# Patient Record
Sex: FEMALE | Race: BLACK OR AFRICAN AMERICAN | NOT HISPANIC OR LATINO | Employment: FULL TIME | URBAN - NONMETROPOLITAN AREA
[De-identification: names, ages, dates, MRNs, and addresses within clinical notes are randomized per-mention and may not be internally consistent; named-entity substitution may affect disease eponyms.]

---

## 2022-02-13 ENCOUNTER — APPOINTMENT (EMERGENCY)
Dept: RADIOLOGY | Facility: HOSPITAL | Age: 27
End: 2022-02-13
Payer: COMMERCIAL

## 2022-02-13 ENCOUNTER — HOSPITAL ENCOUNTER (EMERGENCY)
Facility: HOSPITAL | Age: 27
Discharge: HOME | End: 2022-02-13
Attending: EMERGENCY MEDICINE | Admitting: EMERGENCY MEDICINE
Payer: COMMERCIAL

## 2022-02-13 VITALS
WEIGHT: 293 LBS | DIASTOLIC BLOOD PRESSURE: 65 MMHG | TEMPERATURE: 97.3 F | HEIGHT: 67 IN | RESPIRATION RATE: 16 BRPM | OXYGEN SATURATION: 99 % | SYSTOLIC BLOOD PRESSURE: 124 MMHG | HEART RATE: 85 BPM | BODY MASS INDEX: 45.99 KG/M2

## 2022-02-13 DIAGNOSIS — J45.909 UNCOMPLICATED ASTHMA, UNSPECIFIED ASTHMA SEVERITY, UNSPECIFIED WHETHER PERSISTENT: Primary | ICD-10-CM

## 2022-02-13 LAB
ALBUMIN SERPL BCP-MCNC: 4.1 G/DL (ref 3.5–5)
ALP SERPL-CCNC: 80 U/L (ref 46–116)
ALT SERPL W P-5'-P-CCNC: 38 U/L (ref 12–78)
ANION GAP SERPL CALCULATED.3IONS-SCNC: 9 MMOL/L (ref 4–13)
AST SERPL W P-5'-P-CCNC: 12 U/L (ref 5–45)
BACTERIA UR QL AUTO: ABNORMAL /HPF
BASOPHILS # BLD AUTO: 0.04 THOUSANDS/ΜL (ref 0–0.1)
BASOPHILS NFR BLD AUTO: 1 % (ref 0–1)
BILIRUB SERPL-MCNC: 0.8 MG/DL (ref 0.2–1)
BILIRUB UR QL STRIP: ABNORMAL
BUN SERPL-MCNC: 11 MG/DL (ref 5–25)
CALCIUM SERPL-MCNC: 9 MG/DL (ref 8.3–10.1)
CARDIAC TROPONIN I PNL SERPL HS: <2 NG/L
CHLORIDE SERPL-SCNC: 100 MMOL/L (ref 100–108)
CLARITY UR: ABNORMAL
CO2 SERPL-SCNC: 27 MMOL/L (ref 21–32)
COLOR UR: ABNORMAL
CREAT SERPL-MCNC: 0.95 MG/DL (ref 0.6–1.3)
D DIMER PPP FEU-MCNC: <0.27 UG/ML FEU
EOSINOPHIL # BLD AUTO: 0.06 THOUSAND/ΜL (ref 0–0.61)
EOSINOPHIL NFR BLD AUTO: 1 % (ref 0–6)
ERYTHROCYTE [DISTWIDTH] IN BLOOD BY AUTOMATED COUNT: 13.4 % (ref 11.6–15.1)
EXT PREG TEST URINE: NEGATIVE
EXT. CONTROL ED NAV: NORMAL
GFR SERPL CREATININE-BSD FRML MDRD: 82 ML/MIN/1.73SQ M
GLUCOSE SERPL-MCNC: 126 MG/DL (ref 65–140)
GLUCOSE UR STRIP-MCNC: NEGATIVE MG/DL
HCT VFR BLD AUTO: 39.2 % (ref 34.8–46.1)
HGB BLD-MCNC: 12.8 G/DL (ref 11.5–15.4)
HGB UR QL STRIP.AUTO: NEGATIVE
IMM GRANULOCYTES # BLD AUTO: 0.02 THOUSAND/UL (ref 0–0.2)
IMM GRANULOCYTES NFR BLD AUTO: 0 % (ref 0–2)
KETONES UR STRIP-MCNC: ABNORMAL MG/DL
LEUKOCYTE ESTERASE UR QL STRIP: NEGATIVE
LIPASE SERPL-CCNC: 85 U/L (ref 73–393)
LYMPHOCYTES # BLD AUTO: 2.67 THOUSANDS/ΜL (ref 0.6–4.47)
LYMPHOCYTES NFR BLD AUTO: 38 % (ref 14–44)
MCH RBC QN AUTO: 26.7 PG (ref 26.8–34.3)
MCHC RBC AUTO-ENTMCNC: 32.7 G/DL (ref 31.4–37.4)
MCV RBC AUTO: 82 FL (ref 82–98)
MONOCYTES # BLD AUTO: 0.49 THOUSAND/ΜL (ref 0.17–1.22)
MONOCYTES NFR BLD AUTO: 7 % (ref 4–12)
NEUTROPHILS # BLD AUTO: 3.71 THOUSANDS/ΜL (ref 1.85–7.62)
NEUTS SEG NFR BLD AUTO: 53 % (ref 43–75)
NITRITE UR QL STRIP: NEGATIVE
NON-SQ EPI CELLS URNS QL MICRO: ABNORMAL /HPF
NRBC BLD AUTO-RTO: 0 /100 WBCS
PH UR STRIP.AUTO: 6 [PH]
PLATELET # BLD AUTO: 352 THOUSANDS/UL (ref 149–390)
PMV BLD AUTO: 9.9 FL (ref 8.9–12.7)
POTASSIUM SERPL-SCNC: 3.5 MMOL/L (ref 3.5–5.3)
PROT SERPL-MCNC: 7.8 G/DL (ref 6.4–8.2)
PROT UR STRIP-MCNC: ABNORMAL MG/DL
RBC # BLD AUTO: 4.8 MILLION/UL (ref 3.81–5.12)
RBC #/AREA URNS AUTO: ABNORMAL /HPF
SARS-COV-2 RNA RESP QL NAA+PROBE: NEGATIVE
SODIUM SERPL-SCNC: 136 MMOL/L (ref 136–145)
SP GR UR STRIP.AUTO: >=1.03 (ref 1–1.03)
UROBILINOGEN UR QL STRIP.AUTO: 0.2 E.U./DL
WBC # BLD AUTO: 6.99 THOUSAND/UL (ref 4.31–10.16)
WBC #/AREA URNS AUTO: ABNORMAL /HPF

## 2022-02-13 PROCEDURE — 96360 HYDRATION IV INFUSION INIT: CPT

## 2022-02-13 PROCEDURE — 81001 URINALYSIS AUTO W/SCOPE: CPT | Performed by: EMERGENCY MEDICINE

## 2022-02-13 PROCEDURE — 99285 EMERGENCY DEPT VISIT HI MDM: CPT

## 2022-02-13 PROCEDURE — 87635 SARS-COV-2 COVID-19 AMP PRB: CPT | Performed by: EMERGENCY MEDICINE

## 2022-02-13 PROCEDURE — 80053 COMPREHEN METABOLIC PANEL: CPT | Performed by: EMERGENCY MEDICINE

## 2022-02-13 PROCEDURE — 99285 EMERGENCY DEPT VISIT HI MDM: CPT | Performed by: EMERGENCY MEDICINE

## 2022-02-13 PROCEDURE — 81025 URINE PREGNANCY TEST: CPT | Performed by: EMERGENCY MEDICINE

## 2022-02-13 PROCEDURE — 94640 AIRWAY INHALATION TREATMENT: CPT

## 2022-02-13 PROCEDURE — 84484 ASSAY OF TROPONIN QUANT: CPT | Performed by: EMERGENCY MEDICINE

## 2022-02-13 PROCEDURE — 96361 HYDRATE IV INFUSION ADD-ON: CPT

## 2022-02-13 PROCEDURE — 93005 ELECTROCARDIOGRAM TRACING: CPT

## 2022-02-13 PROCEDURE — 85025 COMPLETE CBC W/AUTO DIFF WBC: CPT | Performed by: EMERGENCY MEDICINE

## 2022-02-13 PROCEDURE — 36415 COLL VENOUS BLD VENIPUNCTURE: CPT | Performed by: EMERGENCY MEDICINE

## 2022-02-13 PROCEDURE — 83690 ASSAY OF LIPASE: CPT | Performed by: EMERGENCY MEDICINE

## 2022-02-13 PROCEDURE — 85379 FIBRIN DEGRADATION QUANT: CPT | Performed by: EMERGENCY MEDICINE

## 2022-02-13 PROCEDURE — 71045 X-RAY EXAM CHEST 1 VIEW: CPT

## 2022-02-13 RX ORDER — NITROGLYCERIN 0.4 MG/1
0.4 TABLET SUBLINGUAL
Status: DISCONTINUED | OUTPATIENT
Start: 2022-02-13 | End: 2022-02-13 | Stop reason: HOSPADM

## 2022-02-13 RX ORDER — IPRATROPIUM BROMIDE AND ALBUTEROL SULFATE 2.5; .5 MG/3ML; MG/3ML
3 SOLUTION RESPIRATORY (INHALATION) ONCE
Status: COMPLETED | OUTPATIENT
Start: 2022-02-13 | End: 2022-02-13

## 2022-02-13 RX ORDER — ASPIRIN 81 MG/1
324 TABLET, CHEWABLE ORAL ONCE
Status: COMPLETED | OUTPATIENT
Start: 2022-02-13 | End: 2022-02-13

## 2022-02-13 RX ORDER — ALBUTEROL SULFATE 90 UG/1
2 AEROSOL, METERED RESPIRATORY (INHALATION) EVERY 6 HOURS PRN
Qty: 6.7 G | Refills: 0 | Status: SHIPPED | OUTPATIENT
Start: 2022-02-13 | End: 2022-02-13 | Stop reason: SDUPTHER

## 2022-02-13 RX ORDER — ALBUTEROL SULFATE 90 UG/1
2 AEROSOL, METERED RESPIRATORY (INHALATION) EVERY 6 HOURS PRN
Qty: 6.7 G | Refills: 0 | Status: SHIPPED | OUTPATIENT
Start: 2022-02-13

## 2022-02-13 RX ADMIN — IPRATROPIUM BROMIDE AND ALBUTEROL SULFATE 3 ML: 2.5; .5 SOLUTION RESPIRATORY (INHALATION) at 10:17

## 2022-02-13 RX ADMIN — SODIUM CHLORIDE 1000 ML: 0.9 INJECTION, SOLUTION INTRAVENOUS at 09:21

## 2022-02-13 RX ADMIN — ASPIRIN 81 MG CHEWABLE TABLET 324 MG: 81 TABLET CHEWABLE at 09:18

## 2022-02-13 RX ADMIN — IPRATROPIUM BROMIDE AND ALBUTEROL SULFATE 3 ML: 2.5; .5 SOLUTION RESPIRATORY (INHALATION) at 09:50

## 2022-02-13 NOTE — ED PROVIDER NOTES
History  Chief Complaint   Patient presents with    Chest Pain     pt arrives reporting chest tightness in middle of chest and left side that has been constant over past few days  pt denies cough, denies fevers, but reoprts pain is making her feel SOB  8/10 left-sided chest pressure for 3 days with associated shortness of breath and radiation to the epigastric area  No fevers or nausea or vomiting  No abdominal pain  Patient has history of hypertension, diabetes  No smoking or drug use  History provided by:  Patient   used: No    Chest Pain  Pain location:  L chest  Pain quality: pressure    Pain radiates to:  Epigastrium  Pain radiates to the back: no    Pain severity now: 8/10  Onset quality:  Gradual  Duration:  3 days  Timing:  Constant  Progression:  Unchanged  Chronicity:  New  Relieved by:  Nothing  Worsened by:  Nothing tried  Ineffective treatments:  None tried  Associated symptoms: shortness of breath    Associated symptoms: no abdominal pain, no altered mental status, no anxiety, no back pain, no claudication, no cough, no dizziness, no dysphagia, no fever, no headache, no lower extremity edema, no nausea, no near-syncope, no numbness, no palpitations, no syncope, not vomiting and no weakness    Risk factors: diabetes mellitus, hypertension and obesity    Risk factors: no birth control and no smoking        None       Past Medical History:   Diagnosis Date    Asthma        Past Surgical History:   Procedure Laterality Date    HIP SURGERY         History reviewed  No pertinent family history  I have reviewed and agree with the history as documented  E-Cigarette/Vaping     E-Cigarette/Vaping Substances     Social History     Tobacco Use    Smoking status: Never Smoker    Smokeless tobacco: Never Used   Substance Use Topics    Alcohol use: Never    Drug use: Never       Review of Systems   Constitutional: Negative for chills and fever     HENT: Negative for ear pain, hearing loss, sore throat, trouble swallowing and voice change  Eyes: Negative for pain and discharge  Respiratory: Positive for shortness of breath  Negative for cough and wheezing  Cardiovascular: Positive for chest pain  Negative for palpitations, claudication, syncope and near-syncope  Gastrointestinal: Negative for abdominal pain, blood in stool, constipation, diarrhea, nausea and vomiting  Genitourinary: Negative for dysuria, flank pain, frequency and hematuria  Musculoskeletal: Negative for back pain, joint swelling, neck pain and neck stiffness  Skin: Negative for rash and wound  Neurological: Negative for dizziness, seizures, syncope, facial asymmetry, weakness, numbness and headaches  Psychiatric/Behavioral: Negative for hallucinations, self-injury and suicidal ideas  All other systems reviewed and are negative  Physical Exam  Physical Exam  Vitals and nursing note reviewed  Constitutional:       General: She is not in acute distress  Appearance: She is well-developed  She is obese  HENT:      Head: Normocephalic and atraumatic  Right Ear: External ear normal       Left Ear: External ear normal    Eyes:      General: No scleral icterus  Right eye: No discharge  Left eye: No discharge  Extraocular Movements: Extraocular movements intact  Conjunctiva/sclera: Conjunctivae normal    Cardiovascular:      Rate and Rhythm: Normal rate and regular rhythm  Heart sounds: Normal heart sounds  No murmur heard  Pulmonary:      Effort: Pulmonary effort is normal       Breath sounds: Normal breath sounds  No decreased breath sounds, wheezing, rhonchi or rales  Abdominal:      General: Bowel sounds are normal  There is no distension  Palpations: Abdomen is soft  Tenderness: There is no abdominal tenderness  There is no guarding or rebound  Musculoskeletal:         General: No deformity  Normal range of motion        Cervical back: Normal range of motion and neck supple  Skin:     General: Skin is warm and dry  Findings: No rash  Neurological:      General: No focal deficit present  Mental Status: She is alert and oriented to person, place, and time  Cranial Nerves: No cranial nerve deficit  Psychiatric:         Mood and Affect: Mood normal          Behavior: Behavior normal          Thought Content:  Thought content normal          Judgment: Judgment normal          Vital Signs  ED Triage Vitals   Temperature Pulse Respirations Blood Pressure SpO2   02/13/22 0857 02/13/22 0856 02/13/22 0856 02/13/22 0856 02/13/22 0856   (!) 97 3 °F (36 3 °C) 103 18 (!) 185/88 98 %      Temp src Heart Rate Source Patient Position - Orthostatic VS BP Location FiO2 (%)   -- -- -- -- --             Pain Score       02/13/22 0856       8           Vitals:    02/13/22 0930 02/13/22 0945 02/13/22 1000 02/13/22 1015   BP: 129/76 101/53 138/80 124/65   Pulse:  84 85 85         Visual Acuity      ED Medications  Medications   nitroglycerin (NITROSTAT) SL tablet 0 4 mg (has no administration in time range)   sodium chloride 0 9 % bolus 1,000 mL (1,000 mL Intravenous New Bag 2/13/22 0921)   aspirin chewable tablet 324 mg (324 mg Oral Given 2/13/22 0918)   ipratropium-albuterol (DUO-NEB) 0 5-2 5 mg/3 mL inhalation solution 3 mL (3 mL Nebulization Given 2/13/22 0950)   ipratropium-albuterol (DUO-NEB) 0 5-2 5 mg/3 mL inhalation solution 3 mL (3 mL Nebulization Given 2/13/22 1017)       Diagnostic Studies  Results Reviewed     Procedure Component Value Units Date/Time    UA w Reflex to Microscopic w Reflex to Culture [[id number]]  (Abnormal) Collected: 02/13/22 0921    Lab Status: Final result Specimen: Urine, Clean Catch Updated: 02/13/22 0959     Color, UA Dk Yellow     Clarity, UA Slightly Cloudy     Specific Gravity, UA >=1 030     pH, UA 6 0     Leukocytes, UA Negative     Nitrite, UA Negative     Protein, UA Trace mg/dl      Glucose, UA Negative mg/dl      Ketones, UA Trace mg/dl      Urobilinogen, UA 0 2 E U /dl      Bilirubin, UA Small     Blood, UA Negative    Urine Microscopic [[id number]]  (Abnormal) Collected: 02/13/22 0921    Lab Status: Final result Specimen: Urine, Clean Catch Updated: 02/13/22 0959     RBC, UA None Seen /hpf      WBC, UA None Seen /hpf      Epithelial Cells Moderate /hpf      Bacteria, UA Moderate /hpf     HS Troponin 0hr (reflex protocol) [[id number]]  (Normal) Collected: 02/13/22 0908    Lab Status: Final result Specimen: Blood from Arm, Right Updated: 02/13/22 0938     hs TnI 0hr <2 ng/L     HS Troponin I 2hr [[id number]]     Lab Status: No result Specimen: Blood     D-dimer, quantitative [[id number]]  (Normal) Collected: 02/13/22 0908    Lab Status: Final result Specimen: Blood from Arm, Right Updated: 02/13/22 0932     D-Dimer, Quant <0 27 ug/ml FEU     Comprehensive metabolic panel [[id number]] Collected: 02/13/22 0908    Lab Status: Final result Specimen: Blood from Arm, Right Updated: 02/13/22 0931     Sodium 136 mmol/L      Potassium 3 5 mmol/L      Chloride 100 mmol/L      CO2 27 mmol/L      ANION GAP 9 mmol/L      BUN 11 mg/dL      Creatinine 0 95 mg/dL      Glucose 126 mg/dL      Calcium 9 0 mg/dL      AST 12 U/L      ALT 38 U/L      Alkaline Phosphatase 80 U/L      Total Protein 7 8 g/dL      Albumin 4 1 g/dL      Total Bilirubin 0 80 mg/dL      eGFR 82 ml/min/1 73sq m     Narrative:      Ivan guidelines for Chronic Kidney Disease (CKD):     Stage 1 with normal or high GFR (GFR > 90 mL/min/1 73 square meters)    Stage 2 Mild CKD (GFR = 60-89 mL/min/1 73 square meters)    Stage 3A Moderate CKD (GFR = 45-59 mL/min/1 73 square meters)    Stage 3B Moderate CKD (GFR = 30-44 mL/min/1 73 square meters)    Stage 4 Severe CKD (GFR = 15-29 mL/min/1 73 square meters)    Stage 5 End Stage CKD (GFR <15 mL/min/1 73 square meters)  Note: GFR calculation is accurate only with a steady state creatinine    Lipase [[id number]]  (Normal) Collected: 02/13/22 0908    Lab Status: Final result Specimen: Blood from Arm, Right Updated: 02/13/22 0931     Lipase 85 u/L     POCT pregnancy, urine [[id number]]  (Normal) Resulted: 02/13/22 0920    Lab Status: Final result Updated: 02/13/22 0920     EXT PREG TEST UR (Ref: Negative) negative     Control valid    CBC and differential [[id number]]  (Abnormal) Collected: 02/13/22 0908    Lab Status: Final result Specimen: Blood from Arm, Right Updated: 02/13/22 0913     WBC 6 99 Thousand/uL      RBC 4 80 Million/uL      Hemoglobin 12 8 g/dL      Hematocrit 39 2 %      MCV 82 fL      MCH 26 7 pg      MCHC 32 7 g/dL      RDW 13 4 %      MPV 9 9 fL      Platelets 935 Thousands/uL      nRBC 0 /100 WBCs      Neutrophils Relative 53 %      Immat GRANS % 0 %      Lymphocytes Relative 38 %      Monocytes Relative 7 %      Eosinophils Relative 1 %      Basophils Relative 1 %      Neutrophils Absolute 3 71 Thousands/µL      Immature Grans Absolute 0 02 Thousand/uL      Lymphocytes Absolute 2 67 Thousands/µL      Monocytes Absolute 0 49 Thousand/µL      Eosinophils Absolute 0 06 Thousand/µL      Basophils Absolute 0 04 Thousands/µL     Novel Coronavirus Taylor El [[id number]] Collected: 02/13/22 0902    Lab Status:  In process Specimen: Nares from Nasopharyngeal Swab Updated: 02/13/22 0911                 XR chest portable   ED Interpretation by Deidre Haynes MD (02/13 4199)   No acute finding                 Procedures  ECG 12 Lead Documentation Only    Date/Time: 2/13/2022 9:01 AM  Performed by: Deidre Haynes MD  Authorized by: Deidre Haynes MD     ECG reviewed by me, the ED Provider: yes    Patient location:  ED  Previous ECG:     Previous ECG:  Unavailable  Interpretation:     Interpretation: normal    Rate:     ECG rate:  94    ECG rate assessment: normal    Rhythm:     Rhythm: sinus rhythm    Ectopy:     Ectopy: none    QRS:     QRS axis:  Normal    QRS intervals:  Normal  Conduction:     Conduction: normal    ST segments:     ST segments:  Normal  T waves:     T waves: normal               ED Course  ED Course as of 02/13/22 1018   Sun Feb 13, 2022   0948 Pain for 3 days but EKG is normal, chest x-ray is normal, troponin is normal   Unlikely cardiac  D-dimer is normal as well  Patient states she has been around respiratory irritants recently, feels this may be asthma related, she had history of asthma in the past but is not currently taking any asthma medications  Will try nebulization  1015 Patient's symptoms significantly improved after nebulization treatment  Chest tightness likely secondary to her prior history of asthma  Patient stable for discharge  Advised to follow-up with her primary care physician for management of her asthma in the future  Patient prescribed inhaler for home use  HEART Risk Score      Most Recent Value   Heart Score Risk Calculator    History 0 Filed at: 02/13/2022 0948   ECG 0 Filed at: 02/13/2022 8046   Age 0 Filed at: 02/13/2022 0948   Risk Factors 2 Filed at: 02/13/2022 0948   Troponin 0 Filed at: 02/13/2022 0948   HEART Score 2 Filed at: 02/13/2022 4508                        SBIRT 22yo+      Most Recent Value   SBIRT (23 yo +)    In order to provide better care to our patients, we are screening all of our patients for alcohol and drug use  Would it be okay to ask you these screening questions? Yes Filed at: 02/13/2022 0930   Initial Alcohol Screen: US AUDIT-C     1  How often do you have a drink containing alcohol? 0 Filed at: 02/13/2022 0930   2  How many drinks containing alcohol do you have on a typical day you are drinking? 0 Filed at: 02/13/2022 0930   3b  FEMALE Any Age, or MALE 65+: How often do you have 4 or more drinks on one occassion? 0 Filed at: 02/13/2022 0930   Audit-C Score 0 Filed at: 02/13/2022 0930   SUJATA: How many times in the past year have you        Used an illegal drug or used a prescription medication for non-medical reasons? Never Filed at: 02/13/2022 0930                    [facility]  Number of Diagnoses or Management Options     Amount and/or Complexity of Data Reviewed  Clinical lab tests: ordered and reviewed  Tests in the radiology section of CPT®: ordered and reviewed  Decide to obtain previous medical records or to obtain history from someone other than the patient: yes  Review and summarize past medical records: yes  Independent visualization of images, tracings, or specimens: yes        Disposition  Final diagnoses:   Uncomplicated asthma, unspecified asthma severity, unspecified whether persistent     Time reflects when diagnosis was documented in both MDM as applicable and the Disposition within this note     Time User Action Codes Description Comment    2/13/2022 10:16 AM Joe Vieira Add [K28 239] Uncomplicated asthma, unspecified asthma severity, unspecified whether persistent       ED Disposition     ED Disposition Condition Date/Time Comment    Discharge Stable Sun Feb 13, 2022 10:16 AM Liss Dick discharge to home/self care  Follow-up Information     Follow up With Specialties Details Why Contact Info    Your primary care physician   As needed           Patient's Medications   Discharge Prescriptions    ALBUTEROL (PROVENTIL HFA) 90 MCG/ACT INHALER    Inhale 2 puffs every 6 (six) hours as needed for wheezing       Start Date: 2/13/2022 End Date: --       Order Dose: 2 puffs       Quantity: 6 7 g    Refills: 0       No discharge procedures on file      PDMP Review     None          ED Provider  Electronically Signed by           Padmini Dimas MD  02/13/22 5393

## 2022-02-15 LAB
ATRIAL RATE: 94 BPM
P AXIS: 40 DEGREES
PR INTERVAL: 122 MS
QRS AXIS: 32 DEGREES
QRSD INTERVAL: 90 MS
QT INTERVAL: 370 MS
QTC INTERVAL: 462 MS
T WAVE AXIS: 30 DEGREES
VENTRICULAR RATE: 94 BPM

## 2022-02-15 PROCEDURE — 93010 ELECTROCARDIOGRAM REPORT: CPT | Performed by: STUDENT IN AN ORGANIZED HEALTH CARE EDUCATION/TRAINING PROGRAM

## 2022-04-27 PROCEDURE — 99283 EMERGENCY DEPT VISIT LOW MDM: CPT

## 2022-04-28 ENCOUNTER — HOSPITAL ENCOUNTER (EMERGENCY)
Facility: HOSPITAL | Age: 27
Discharge: HOME | End: 2022-04-28
Attending: EMERGENCY MEDICINE | Admitting: EMERGENCY MEDICINE
Payer: COMMERCIAL

## 2022-04-28 VITALS
OXYGEN SATURATION: 100 % | WEIGHT: 293 LBS | SYSTOLIC BLOOD PRESSURE: 146 MMHG | TEMPERATURE: 97.9 F | HEIGHT: 67 IN | HEART RATE: 93 BPM | DIASTOLIC BLOOD PRESSURE: 96 MMHG | RESPIRATION RATE: 18 BRPM | BODY MASS INDEX: 45.99 KG/M2

## 2022-04-28 DIAGNOSIS — M54.9 BACK PAIN, UNSPECIFIED BACK LOCATION, UNSPECIFIED BACK PAIN LATERALITY, UNSPECIFIED CHRONICITY: Primary | ICD-10-CM

## 2022-04-28 PROCEDURE — 99284 EMERGENCY DEPT VISIT MOD MDM: CPT | Performed by: EMERGENCY MEDICINE

## 2022-04-28 PROCEDURE — 96372 THER/PROPH/DIAG INJ SC/IM: CPT

## 2022-04-28 RX ORDER — MEDROXYPROGESTERONE ACETATE 10 MG/1
10 TABLET ORAL
COMMUNITY
Start: 2022-03-23 | End: 2022-06-29

## 2022-04-28 RX ORDER — LIDOCAINE 50 MG/G
1 PATCH TOPICAL DAILY
Qty: 10 PATCH | Refills: 0 | Status: SHIPPED | OUTPATIENT
Start: 2022-04-28 | End: 2022-07-18

## 2022-04-28 RX ORDER — CYCLOBENZAPRINE HCL 10 MG
10 TABLET ORAL 2 TIMES DAILY PRN
Qty: 20 TABLET | Refills: 0 | Status: SHIPPED | OUTPATIENT
Start: 2022-04-28 | End: 2022-06-29

## 2022-04-28 RX ORDER — LIDOCAINE 50 MG/G
2 PATCH TOPICAL ONCE
Status: DISCONTINUED | OUTPATIENT
Start: 2022-04-28 | End: 2022-04-28 | Stop reason: HOSPADM

## 2022-04-28 RX ORDER — ATORVASTATIN CALCIUM 40 MG/1
40 TABLET, FILM COATED ORAL DAILY
COMMUNITY
Start: 2021-12-02

## 2022-04-28 RX ORDER — LOSARTAN POTASSIUM 50 MG/1
50 TABLET ORAL DAILY
COMMUNITY
Start: 2021-12-02 | End: 2022-12-02

## 2022-04-28 RX ORDER — HYDROCHLOROTHIAZIDE 12.5 MG/1
12.5 TABLET ORAL DAILY
COMMUNITY
Start: 2021-12-02

## 2022-04-28 RX ORDER — KETOROLAC TROMETHAMINE 30 MG/ML
30 INJECTION, SOLUTION INTRAMUSCULAR; INTRAVENOUS ONCE
Status: COMPLETED | OUTPATIENT
Start: 2022-04-28 | End: 2022-04-28

## 2022-04-28 RX ADMIN — LIDOCAINE 2 PATCH: 50 PATCH TOPICAL at 00:38

## 2022-04-28 RX ADMIN — KETOROLAC TROMETHAMINE 30 MG: 30 INJECTION, SOLUTION INTRAMUSCULAR at 00:37

## 2022-04-28 NOTE — ED PROVIDER NOTES
History  Chief Complaint   Patient presents with    Back Pain     starting the 16th with low back pain on both sides but more severe on the right  sometimes pain radiates into front  denies any bowel or bladder issues     12-year-old female with past medical history pertinent for obesity, asthma who presents to the emergency department for evaluation of lumbar back pain that started on the 16th on both sides but now has become more severe on the right  Reports pain sometimes radiates towards the front  Denies any bowel or bladder issues  States she initially thought was related to her menstrual cycle but her menstrual cycle has already passed and she continues to have pain  States that she was doing a new exercise routine and may have strained her back a couple weeks ago  Has not done exercise routine since then  Denies any radiation of her pain towards her legs  Denies any abdominal pain at this time  States that she last use ibuprofen yesterday and Tylenol earlier today  Did use icy Hot as well but did not use any recently  Denies any bowel or bladder incontinence, no IV drug use, no injury or trauma  No other concerns  Prior to Admission Medications   Prescriptions Last Dose Informant Patient Reported? Taking?    albuterol (Proventil HFA) 90 mcg/act inhaler Past Week at Unknown time  No Yes   Sig: Inhale 2 puffs every 6 (six) hours as needed for wheezing   atorvastatin (LIPITOR) 40 mg tablet 4/28/2022 at Unknown time  Yes Yes   Sig: Take 40 mg by mouth daily   hydrochlorothiazide (HYDRODIURIL) 12 5 mg tablet 4/28/2022 at Unknown time  Yes Yes   Sig: Take 12 5 mg by mouth daily   insulin glargine (LANTUS SOLOSTAR) 100 units/mL injection pen 4/28/2022 at Unknown time  Yes Yes   Sig: Inject 12 Units under the skin daily   losartan (COZAAR) 50 mg tablet 4/28/2022 at Unknown time  Yes Yes   Sig: Take 50 mg by mouth daily   medroxyPROGESTERone (PROVERA) 10 mg tablet Past Month at Unknown time  Yes Yes   Sig: Take 10 mg by mouth every 30 (thirty) days   metFORMIN (GLUCOPHAGE) 1000 MG tablet 4/28/2022 at Unknown time  Yes Yes   Sig: Take 1 tablet by mouth 2 (two) times a day with meals      Facility-Administered Medications: None       Past Medical History:   Diagnosis Date    Asthma        Past Surgical History:   Procedure Laterality Date    HIP SURGERY         History reviewed  No pertinent family history  I have reviewed and agree with the history as documented  E-Cigarette/Vaping     E-Cigarette/Vaping Substances     Social History     Tobacco Use    Smoking status: Never Smoker    Smokeless tobacco: Never Used   Substance Use Topics    Alcohol use: Never    Drug use: Never       Review of Systems   Constitutional: Negative for activity change, appetite change, chills and fever  HENT: Negative for congestion, rhinorrhea and sore throat  Eyes: Negative for visual disturbance  Respiratory: Negative for chest tightness, shortness of breath and wheezing  Cardiovascular: Negative for chest pain, palpitations and leg swelling  Gastrointestinal: Negative for abdominal pain, constipation, diarrhea, nausea and vomiting  Genitourinary: Negative for dysuria, flank pain, menstrual problem, pelvic pain, vaginal bleeding, vaginal discharge and vaginal pain  Musculoskeletal: Positive for back pain  Negative for gait problem and neck pain  Skin: Negative for rash  Neurological: Negative for weakness, numbness and headaches  Psychiatric/Behavioral: Negative for behavioral problems  Physical Exam  Physical Exam  Vitals and nursing note reviewed  Constitutional:       General: She is not in acute distress  Appearance: She is well-developed  She is obese  She is not diaphoretic  HENT:      Head: Normocephalic and atraumatic        Right Ear: External ear normal       Left Ear: External ear normal       Nose: Nose normal    Eyes:      Pupils: Pupils are equal, round, and reactive to light  Cardiovascular:      Rate and Rhythm: Normal rate and regular rhythm  Pulses: Normal pulses  Heart sounds: Normal heart sounds  Pulmonary:      Effort: Pulmonary effort is normal  No respiratory distress  Breath sounds: Normal breath sounds  No wheezing or rales  Abdominal:      General: Bowel sounds are normal       Palpations: Abdomen is soft  Tenderness: There is no abdominal tenderness  Musculoskeletal:         General: Tenderness (Paraspinal tenderness in the lumbar back; worse with movement) present  No deformity  Normal range of motion  Cervical back: Normal range of motion and neck supple  Comments: No midline spinal tenderness to palpation   Skin:     General: Skin is warm and dry  Capillary Refill: Capillary refill takes less than 2 seconds  Neurological:      General: No focal deficit present  Mental Status: She is alert and oriented to person, place, and time  Motor: No abnormal muscle tone  Comments: Patient is awake and alert and oriented x 3  No apparent deficits of memory or concentration  Speech is fluent  Fund of knowledge is appropriate  CN II - No field cuts by confrontation  CN III, IV, VI - pupils are 3 mm and briskly reactive  EOMs are full with no nystagmus  CN V - facial sensation is intact  CN VII - no facial asymmetry  CN VIII - auditory acuity is grossly intact to finger rub bilaterally  CN IX - palate rises symmetrically  CN XI - SCM and trapezius muscles are intact  CN XII - tongue protrudes midline  Sensory exam in intact to light touch, pinprick in all dermatomes tested  Coordination is grossly intact for finger-to-nose  5/5 strength in all extremities    Ambulatory here without issue  Straight leg test negative bilaterally         Vital Signs  ED Triage Vitals [04/28/22 0010]   Temperature Pulse Respirations Blood Pressure SpO2   97 9 °F (36 6 °C) 93 18 146/96 100 %      Temp Source Heart Rate Source Patient Position - Orthostatic VS BP Location FiO2 (%)   Temporal Monitor Sitting Right arm --      Pain Score       10 - Worst Possible Pain           Vitals:    04/28/22 0010   BP: 146/96   Pulse: 93   Patient Position - Orthostatic VS: Sitting         Visual Acuity      ED Medications  Medications   ketorolac (TORADOL) injection 30 mg (has no administration in time range)   lidocaine (LIDODERM) 5 % patch 2 patch (has no administration in time range)       Diagnostic Studies  Results Reviewed     None                 No orders to display              Procedures  Procedures         ED Course           MDM  Number of Diagnoses or Management Options  Back pain, unspecified back location, unspecified back pain laterality, unspecified chronicity  Diagnosis management comments: 17-year-old female with past medical history pertinent for obesity, asthma who presents to the emergency department for evaluation of lumbar back pain that started on the 16th on both sides but now has become more severe on the right  Vital signs on arrival here non concerning  Exam as above  Patient has no red flag signs or symptoms  The patient does not require any imaging  Patient was given Lidoderm patch and Toradol here for pain control  Patient prescribed Lidoderm patches and Flexeril for additional relief  Advised following up with her primary care physician and returning for worsening symptoms  Work note provided         Amount and/or Complexity of Data Reviewed  Review and summarize past medical records: yes    Risk of Complications, Morbidity, and/or Mortality  Presenting problems: moderate  Diagnostic procedures: moderate  Management options: moderate        Disposition  Final diagnoses:   Back pain, unspecified back location, unspecified back pain laterality, unspecified chronicity     Time reflects when diagnosis was documented in both MDM as applicable and the Disposition within this note     Time User Action Codes Description Comment    4/28/2022 12:14 AM Michael Andrade Add [M54 9] Back pain, unspecified back location, unspecified back pain laterality, unspecified chronicity       ED Disposition     None      Follow-up Information     Follow up With Specialties Details Why Contact Yosi Harley MD Internal Medicine, Cardiology   [address] [facility]  [phone]            Patient's Medications   Discharge Prescriptions    CYCLOBENZAPRINE (FLEXERIL) 10 MG TABLET    Take 1 tablet (10 mg total) by mouth 2 (two) times a day as needed for muscle spasms       Start Date: 4/28/2022 End Date: --       Order Dose: 10 mg       Quantity: 20 tablet    Refills: 0    LIDOCAINE (LIDODERM) 5 %    Apply 1 patch topically daily Remove & Discard patch within 12 hours or as directed by MD       Start Date: 4/28/2022 End Date: --       Order Dose: 1 patch       Quantity: 10 patch    Refills: 0       No discharge procedures on file      PDMP Review     None          ED Provider  Electronically Signed by           Serenity Clemons MD  04/28/22 7652

## 2022-04-28 NOTE — DISCHARGE INSTRUCTIONS
Thank you for letting us take care of you  You have been evaluated for back pain  Please follow-up as instructed  Please use the medications prescribed  Please return for worsening symptoms  At this time, you have no clinical evidence of symptoms or problems that will require hospitalization, however you should be evaluated soon by a primary care physician, and contact information has been provided  Follow up with your primary care physician  This is important as many medical conditions can be managed as an outpatient, in addition to routine health screening  Seeing your primary doctor often can help identify changes in the medical issue that brought you to the ED for care today  If you experience any new symptoms or acute worsening of current symptoms, please return to the ED

## 2022-04-28 NOTE — Clinical Note
Mary Jane Rubio was seen and treated in our emergency department on 4/27/2022  Diagnosis:      Marisol Driscoll  may return to work on return date  She may return on this date: 04/28/2022    No heavy lifting until seen by primary care physician     If you have any questions or concerns, please don't hesitate to call        Saleem Valerio MD    ______________________________           _______________          _______________  Hospital Representative                              Date                                Time

## 2022-06-14 ENCOUNTER — HOSPITAL ENCOUNTER (EMERGENCY)
Facility: HOSPITAL | Age: 27
Discharge: HOME | End: 2022-06-14
Attending: STUDENT IN AN ORGANIZED HEALTH CARE EDUCATION/TRAINING PROGRAM | Admitting: STUDENT IN AN ORGANIZED HEALTH CARE EDUCATION/TRAINING PROGRAM
Payer: COMMERCIAL

## 2022-06-14 VITALS
SYSTOLIC BLOOD PRESSURE: 160 MMHG | TEMPERATURE: 98.4 F | DIASTOLIC BLOOD PRESSURE: 99 MMHG | RESPIRATION RATE: 18 BRPM | OXYGEN SATURATION: 95 % | HEART RATE: 87 BPM

## 2022-06-14 DIAGNOSIS — M54.50 RIGHT-SIDED LOW BACK PAIN WITHOUT SCIATICA, UNSPECIFIED CHRONICITY: Primary | ICD-10-CM

## 2022-06-14 PROCEDURE — 96372 THER/PROPH/DIAG INJ SC/IM: CPT

## 2022-06-14 PROCEDURE — 99283 EMERGENCY DEPT VISIT LOW MDM: CPT

## 2022-06-14 PROCEDURE — 99284 EMERGENCY DEPT VISIT MOD MDM: CPT | Performed by: STUDENT IN AN ORGANIZED HEALTH CARE EDUCATION/TRAINING PROGRAM

## 2022-06-14 RX ORDER — ACETAMINOPHEN 325 MG/1
975 TABLET ORAL ONCE
Status: COMPLETED | OUTPATIENT
Start: 2022-06-14 | End: 2022-06-14

## 2022-06-14 RX ORDER — KETOROLAC TROMETHAMINE 30 MG/ML
30 INJECTION, SOLUTION INTRAMUSCULAR; INTRAVENOUS ONCE
Status: COMPLETED | OUTPATIENT
Start: 2022-06-14 | End: 2022-06-14

## 2022-06-14 RX ADMIN — KETOROLAC TROMETHAMINE 30 MG: 30 INJECTION, SOLUTION INTRAMUSCULAR at 01:30

## 2022-06-14 RX ADMIN — ACETAMINOPHEN 975 MG: 325 TABLET ORAL at 01:29

## 2022-06-14 NOTE — DISCHARGE INSTRUCTIONS
You were evaluated in the emergency department for right lower back pain  For pain, you can start taking Motrin 600 mg every 6-8 hours and Tylenol 1000 mg every 6-8 hours  Instead of taking Flexeril 10 mg twice daily, you can start taking a half pill every 8 hours as needed  Start applying warm compresses to your right lower back  Follow-up with your primary care physician  Do not hesitate to be re-evaluated in the ED for any concerning signs or symptoms

## 2022-06-14 NOTE — ED PROVIDER NOTES
History  Chief Complaint   Patient presents with    Back Pain     Pt reports lower right back pain since April that radiates into her hip  States she was prescribed a muscle relaxer and lidocaine patches but they havent worked for her  History provided by:  Patient  Back Pain  Location:  Sacro-iliac joint and lumbar spine  Quality:  Shooting  Radiates to: right hip  Pain severity:  Severe  Pain is:  Unable to specify  Onset quality:  Gradual  Duration:  2 months  Timing:  Constant  Progression:  Worsening  Chronicity:  Recurrent  Context: not falling, not physical stress, not recent injury and not twisting    Relieved by:  Nothing  Worsened by:  Twisting, touching and movement  Ineffective treatments: Motrin/Tylenol/Flexeril/lidocaine patches   Associated symptoms: leg pain    Associated symptoms: no abdominal pain, no bladder incontinence, no bowel incontinence, no dysuria, no numbness, no paresthesias, no pelvic pain, no perianal numbness, no tingling and no weakness       32year old F  Presents to the ED with right lower back pain  Progressively worsening since April  Denies recent injury  The patient was evaluated in the ED in April for similar pain  Was prescribed lidocaine patches/Flexeril which hasn't been able to alleviate her pain  The Flexeril only makes her tired (10 mg BID)  Has also been taking Motrin and Tylenol without improvement of her pain  Describes her pain as sharp in nature and 9/10 in severity  Movement exacerbates her pain  Denies saddle anesthesia, bowel/bladder dysfunction  The patient works security  Cannot display prior to admission medications because the patient has not been admitted in this contact  Past Medical History:   Diagnosis Date    Asthma      Past Surgical History:   Procedure Laterality Date    HIP SURGERY       History reviewed  No pertinent family history  I have reviewed and agree with the history as documented      E-Cigarette/Vaping E-Cigarette/Vaping Substances     Social History     Tobacco Use    Smoking status: Never Smoker    Smokeless tobacco: Never Used   Substance Use Topics    Alcohol use: Never    Drug use: Never     Review of Systems   Gastrointestinal: Negative for abdominal pain, bowel incontinence, nausea and vomiting  Genitourinary: Negative for bladder incontinence, decreased urine volume, difficulty urinating, dysuria, flank pain, frequency, hematuria, pelvic pain and urgency  Musculoskeletal: Positive for back pain  Negative for arthralgias, gait problem, myalgias, neck pain and neck stiffness  Skin: Negative for color change, pallor, rash and wound  Neurological: Negative for tingling, weakness, numbness and paresthesias  All other systems reviewed and are negative  Physical Exam  Physical Exam  Vitals and nursing note reviewed  Constitutional:       General: She is in acute distress  Appearance: She is not ill-appearing or toxic-appearing  Comments: Elevated BMI   HENT:      Head: Normocephalic and atraumatic  Right Ear: External ear normal       Left Ear: External ear normal    Eyes:      General:         Right eye: No discharge  Left eye: No discharge  Extraocular Movements: Extraocular movements intact  Conjunctiva/sclera: Conjunctivae normal    Cardiovascular:      Rate and Rhythm: Normal rate and regular rhythm  Pulses: Normal pulses  Heart sounds: Normal heart sounds  No murmur heard  Pulmonary:      Effort: Pulmonary effort is normal  No respiratory distress  Breath sounds: Normal breath sounds  No stridor  No wheezing, rhonchi or rales  Chest:      Chest wall: No tenderness  Abdominal:      General: Abdomen is flat  Bowel sounds are normal       Tenderness: There is no abdominal tenderness  There is no guarding or rebound  Musculoskeletal:         General: Tenderness present  No swelling or signs of injury  Cervical back: Neck supple  Back:       Comments: TTP along the right lower back  No midline back tenderness  No overlying skin changes  Skin:     General: Skin is warm and dry  Capillary Refill: Capillary refill takes less than 2 seconds  Coloration: Skin is not jaundiced or pale  Findings: No bruising, erythema, lesion or rash  Neurological:      General: No focal deficit present  Mental Status: She is alert and oriented to person, place, and time  Mental status is at baseline  Cranial Nerves: No cranial nerve deficit  Sensory: No sensory deficit  Motor: No weakness  Gait: Gait normal       Deep Tendon Reflexes: Reflexes normal    Psychiatric:         Mood and Affect: Mood normal          Behavior: Behavior normal          Thought Content: Thought content normal          Judgment: Judgment normal        Vital Signs  ED Triage Vitals   Temperature Pulse Respirations Blood Pressure SpO2   06/14/22 0109 06/14/22 0109 06/14/22 0109 06/14/22 0109 06/14/22 0109   98 4 °F (36 9 °C) 87 18 160/99 95 %      Temp Source Heart Rate Source Patient Position - Orthostatic VS BP Location FiO2 (%)   06/14/22 0109 06/14/22 0109 06/14/22 0109 06/14/22 0109 --   Temporal Monitor Sitting Right arm       Pain Score       06/14/22 0129       8         Vitals:    06/14/22 0109   BP: 160/99   Pulse: 87   Patient Position - Orthostatic VS: Sitting     ED Medications  Medications   acetaminophen (TYLENOL) tablet 975 mg (975 mg Oral Given 6/14/22 0129)   ketorolac (TORADOL) injection 30 mg (30 mg Intramuscular Given 6/14/22 0130)     Diagnostic Studies  Results Reviewed     None             No orders to display         Procedures  Procedures     ED Course       MDM     68-year-old female  Presents to the emergency department with worsening right lower back pain  She states that she has been having right lower back pain since April 2022  Denies recent injury/trauma    Has been taking Motrin/Tylenol, Flexeril intermittently without improvement  The patient denies red flag symptoms of cauda equina syndrome  On exam, the patient has tenderness to palpation over the right lower back  No midline tenderness  No overlying skin changes  Lower extremity strength is symmetric  Normal DTRs  The patient was administered a dose of intramuscular Toradol, oral Tylenol which improved her pain  A regimen of Motrin/Tylenol, warm compresses was recommended along with taking Flexeril 5 mg Q8h rather than 10 mg BID  She may require a Pain Medicine referral and/or MRI in the future  PCP follow up encouraged  Return precautions discussed  The patient was stable for discharge  Disposition  Final diagnoses:   Right-sided low back pain without sciatica, unspecified chronicity     Time reflects when diagnosis was documented in both MDM as applicable and the Disposition within this note     Time User Action Codes Description Comment    6/14/2022  1:57 AM Eddie Solano Add [M54 50] Right-sided low back pain without sciatica, unspecified chronicity       ED Disposition     ED Disposition   Discharge    Condition   Stable    Date/Time   Tue Jun 14, 2022  1:57 AM    Comment   Carmen Waterman discharge to home/self care  Follow-up Information     Follow up With Specialties Details Why Contact Filomena Mills MD Internal Medicine, Cardiology   [address] [address]  [phone]            Patient's Medications   Discharge Prescriptions    No medications on file       No discharge procedures on file      PDMP Review     None          ED Provider  Electronically Signed by           Angelina Morales DO  06/14/22 7100

## 2022-06-25 ENCOUNTER — APPOINTMENT (EMERGENCY)
Dept: RADIOLOGY | Facility: HOSPITAL | Age: 27
End: 2022-06-25
Payer: COMMERCIAL

## 2022-06-25 ENCOUNTER — HOSPITAL ENCOUNTER (EMERGENCY)
Facility: HOSPITAL | Age: 27
Discharge: HOME | End: 2022-06-25
Attending: STUDENT IN AN ORGANIZED HEALTH CARE EDUCATION/TRAINING PROGRAM | Admitting: STUDENT IN AN ORGANIZED HEALTH CARE EDUCATION/TRAINING PROGRAM
Payer: COMMERCIAL

## 2022-06-25 VITALS
OXYGEN SATURATION: 96 % | RESPIRATION RATE: 18 BRPM | BODY MASS INDEX: 45.99 KG/M2 | TEMPERATURE: 98.4 F | SYSTOLIC BLOOD PRESSURE: 141 MMHG | WEIGHT: 293 LBS | HEART RATE: 109 BPM | DIASTOLIC BLOOD PRESSURE: 98 MMHG | HEIGHT: 67 IN

## 2022-06-25 DIAGNOSIS — M79.604 LOW BACK PAIN RADIATING TO RIGHT LOWER EXTREMITY: Primary | ICD-10-CM

## 2022-06-25 DIAGNOSIS — M54.50 LOW BACK PAIN RADIATING TO RIGHT LOWER EXTREMITY: Primary | ICD-10-CM

## 2022-06-25 PROCEDURE — 73502 X-RAY EXAM HIP UNI 2-3 VIEWS: CPT

## 2022-06-25 PROCEDURE — 99283 EMERGENCY DEPT VISIT LOW MDM: CPT

## 2022-06-25 PROCEDURE — 99284 EMERGENCY DEPT VISIT MOD MDM: CPT | Performed by: STUDENT IN AN ORGANIZED HEALTH CARE EDUCATION/TRAINING PROGRAM

## 2022-06-25 PROCEDURE — 96372 THER/PROPH/DIAG INJ SC/IM: CPT

## 2022-06-25 RX ORDER — ACETAMINOPHEN 325 MG/1
975 TABLET ORAL ONCE
Status: COMPLETED | OUTPATIENT
Start: 2022-06-25 | End: 2022-06-25

## 2022-06-25 RX ORDER — OXYCODONE HYDROCHLORIDE 5 MG/1
5 TABLET ORAL ONCE
Status: COMPLETED | OUTPATIENT
Start: 2022-06-25 | End: 2022-06-25

## 2022-06-25 RX ORDER — KETOROLAC TROMETHAMINE 30 MG/ML
30 INJECTION, SOLUTION INTRAMUSCULAR; INTRAVENOUS ONCE
Status: COMPLETED | OUTPATIENT
Start: 2022-06-25 | End: 2022-06-25

## 2022-06-25 RX ADMIN — KETOROLAC TROMETHAMINE 30 MG: 30 INJECTION, SOLUTION INTRAMUSCULAR at 22:50

## 2022-06-25 RX ADMIN — ACETAMINOPHEN 975 MG: 325 TABLET ORAL at 22:50

## 2022-06-25 RX ADMIN — OXYCODONE HYDROCHLORIDE 5 MG: 5 TABLET ORAL at 23:31

## 2022-06-26 NOTE — ED PROVIDER NOTES
History  Chief Complaint   Patient presents with    Back Pain     Pt stated she has been here several times for the same right lower back pain and she gets no relief  Pain wraps around her right side to her pelvis for the last several days  Radiates to her thigh and now she is having problems walking and dressing herself  Denies urinary symptoms  History provided by:  Patient  Back Pain  Location:  Sacro-iliac joint  Radiates to: right hip  Pain severity:  Severe  Pain is:  Unable to specify  Onset quality:  Gradual  Timing:  Constant  Progression:  Worsening  Chronicity:  New  Context: not physical stress, not recent injury and not twisting    Relieved by:  Nothing  Worsened by:  Twisting, movement, ambulation and bending  Ineffective treatments:  Ibuprofen, NSAIDs, OTC medications and muscle relaxants  Associated symptoms: leg pain    Associated symptoms: no abdominal pain, no bladder incontinence, no bowel incontinence, no fever, no headaches, no numbness, no paresthesias, no pelvic pain, no perianal numbness, no tingling and no weakness       32year old F  Presents to the ED with right lower back pain with radiation to the right hip  Worsening over the past 72 hours  Has been taking ibuprofen, Tylenol, muscle relaxants and applying icy hot without relief  Denies bladder/bowel incontinence  Describes her pain as sharp/achy in nature and 10/10 in severity  Movement exacerbates the pain  Has an appointment with a chiropractor and orthopedic specialists in July  Hasn't taken any for pain since this morning  Denies recent trauma  Prior to Admission Medications   Prescriptions Last Dose Informant Patient Reported? Taking?    albuterol (Proventil HFA) 90 mcg/act inhaler   No No   Sig: Inhale 2 puffs every 6 (six) hours as needed for wheezing   atorvastatin (LIPITOR) 40 mg tablet   Yes No   Sig: Take 40 mg by mouth daily   cyclobenzaprine (FLEXERIL) 10 mg tablet   No No   Sig: Take 1 tablet (10 mg total) by mouth 2 (two) times a day as needed for muscle spasms   hydrochlorothiazide (HYDRODIURIL) 12 5 mg tablet   Yes No   Sig: Take 12 5 mg by mouth daily   insulin glargine (LANTUS SOLOSTAR) 100 units/mL injection pen   Yes No   Sig: Inject 12 Units under the skin daily   lidocaine (Lidoderm) 5 %   No No   Sig: Apply 1 patch topically daily Remove & Discard patch within 12 hours or as directed by MD   losartan (COZAAR) 50 mg tablet   Yes No   Sig: Take 50 mg by mouth daily   medroxyPROGESTERone (PROVERA) 10 mg tablet   Yes No   Sig: Take 10 mg by mouth every 30 (thirty) days   metFORMIN (GLUCOPHAGE) 1000 MG tablet   Yes No   Sig: Take 1 tablet by mouth 2 (two) times a day with meals      Facility-Administered Medications: None     Past Medical History:   Diagnosis Date    Asthma      Past Surgical History:   Procedure Laterality Date    HIP SURGERY       History reviewed  No pertinent family history  I have reviewed and agree with the history as documented  E-Cigarette/Vaping     E-Cigarette/Vaping Substances     Social History     Tobacco Use    Smoking status: Never Smoker    Smokeless tobacco: Never Used   Substance Use Topics    Alcohol use: Never    Drug use: Never     Review of Systems   Constitutional: Negative for chills, fever and unexpected weight change  Gastrointestinal: Negative for abdominal pain and bowel incontinence  Genitourinary: Negative for bladder incontinence, decreased urine volume, difficulty urinating, flank pain, frequency, pelvic pain and urgency  Musculoskeletal: Positive for arthralgias, back pain, gait problem and myalgias  Skin: Negative for color change, pallor, rash and wound  Neurological: Negative for dizziness, tingling, weakness, numbness, headaches and paresthesias  Hematological: Does not bruise/bleed easily  Psychiatric/Behavioral: Negative for confusion  All other systems reviewed and are negative      Physical Exam  Physical Exam  Vitals and nursing note reviewed  Constitutional:       General: She is in acute distress  Appearance: She is not ill-appearing or toxic-appearing  Comments: Elevated BMI   HENT:      Head: Normocephalic and atraumatic  Right Ear: External ear normal       Left Ear: External ear normal       Nose: No congestion or rhinorrhea  Eyes:      Extraocular Movements: Extraocular movements intact  Conjunctiva/sclera: Conjunctivae normal    Cardiovascular:      Rate and Rhythm: Normal rate and regular rhythm  Pulmonary:      Effort: Pulmonary effort is normal  No respiratory distress  Abdominal:      General: Bowel sounds are normal       Palpations: Abdomen is soft  Tenderness: There is no abdominal tenderness  There is no right CVA tenderness, left CVA tenderness, guarding or rebound  Musculoskeletal:         General: Tenderness present  No swelling  Cervical back: Neck supple  No tenderness  Right lower leg: No edema  Left lower leg: No edema  Legs:       Comments: Tenderness to palpation over the right lower back with radiation of the pain into the right hip  No midline back pain  No overlying skin changes  Skin:     General: Skin is warm and dry  Capillary Refill: Capillary refill takes less than 2 seconds  Coloration: Skin is not jaundiced or pale  Findings: No bruising, erythema, lesion or rash  Neurological:      General: No focal deficit present  Mental Status: She is alert and oriented to person, place, and time  Mental status is at baseline  Sensory: No sensory deficit  Motor: No weakness  Deep Tendon Reflexes: Reflexes normal       Comments: 2+ patellar DTR bilaterally  Dorsiflexion of the right great toe is normal    Psychiatric:         Mood and Affect: Mood normal          Behavior: Behavior normal          Thought Content:  Thought content normal          Judgment: Judgment normal          Vital Signs  ED Triage Vitals   Temperature Pulse Respirations Blood Pressure SpO2   06/25/22 2222 06/25/22 2220 06/25/22 2220 06/25/22 2220 06/25/22 2220   98 4 °F (36 9 °C) (!) 109 18 141/98 96 %      Temp Source Heart Rate Source Patient Position - Orthostatic VS BP Location FiO2 (%)   06/25/22 2220 06/25/22 2220 06/25/22 2220 06/25/22 2220 --   Temporal Monitor Sitting Right arm       Pain Score       06/25/22 2220       10 - Worst Possible Pain           Vitals:    06/25/22 2220   BP: 141/98   Pulse: (!) 109   Patient Position - Orthostatic VS: Sitting     ED Medications  Medications   ketorolac (TORADOL) injection 30 mg (30 mg Intramuscular Given 6/25/22 2250)   acetaminophen (TYLENOL) tablet 975 mg (975 mg Oral Given 6/25/22 2250)   oxyCODONE (ROXICODONE) IR tablet 5 mg (5 mg Oral Given 6/25/22 2331)     Diagnostic Studies  Results Reviewed     None             XR hip/pelv 2-3 vws right if performed   ED Interpretation by Nikki Mustafa DO (06/25 2325)   No acute osseous abnormalities  Procedures  Procedures     ED Course     SBIRT 22yo+    Flowsheet Row Most Recent Value   SBIRT (23 yo +)    In order to provide better care to our patients, we are screening all of our patients for alcohol and drug use  Would it be okay to ask you these screening questions? No Filed at: 06/25/2022 2222        MDM     80-year-old female  Presents to the emergency department with right lower back/right hip pain  The patient has been evaluated in the ED multiple times for similar pain  Denies recent trauma/injury  On exam, the patient has tenderness to palpation along the right lower back/right hip  No midline tenderness  Denies red flag symptoms of cauda equina syndrome  DTRs are intact  Symmetric lower extremity strength  XRs negative for acute findings  Hardware appears into be intact  The patient was administered a dose of IM Toradol, PO APAP/Oxycodone  OP Ortho referral provided   Conservative care measures and return precautions were discussed with the patient  All questions were addressed  The patient was stable for discharge  Disposition  Final diagnoses:   Low back pain radiating to right lower extremity     Time reflects when diagnosis was documented in both MDM as applicable and the Disposition within this note     Time User Action Codes Description Comment    6/25/2022 11:29 PM Bettina Kumar [[id number],  M79 [id number]] Low back pain radiating to right lower extremity       ED Disposition     ED Disposition   Discharge    Condition   Stable    Date/Time   Sat Jun 25, 2022 11:28 PM    Comment   Olivier Schilling discharge to home/self care  Follow-up Information     Follow up With Specialties Details Why [address] [facility] In 1 week  [address] [facility]  [phone]            Patient's Medications   Discharge Prescriptions    No medications on file       No discharge procedures on file      PDMP Review     None          ED Provider  Electronically Signed by           Sepideh Escalante DO  06/25/22 6383

## 2022-06-26 NOTE — DISCHARGE INSTRUCTIONS
You were evaluated in the ED for low back pain  The xray that was obtained did not show any significant abnormalities  For pain, you can take Motrin 600 mg every 6 hours and Tylenol 1000 mg every 6 hours  A referral to Orthopedics provided  Expect a phone call within the next few days to set up the appointment  Follow-up with your PCP  Do not hesitate to be re-evaluated in the ED for any concerning signs or symptoms

## 2022-06-29 ENCOUNTER — CONSULT (OUTPATIENT)
Dept: PAIN MEDICINE | Facility: CLINIC | Age: 27
End: 2022-06-29
Payer: COMMERCIAL

## 2022-06-29 ENCOUNTER — TELEPHONE (OUTPATIENT)
Dept: PAIN MEDICINE | Facility: CLINIC | Age: 27
End: 2022-06-29

## 2022-06-29 ENCOUNTER — HOSPITAL ENCOUNTER (OUTPATIENT)
Dept: RADIOLOGY | Facility: HOSPITAL | Age: 27
Discharge: HOME | End: 2022-06-29
Attending: ANESTHESIOLOGY
Payer: COMMERCIAL

## 2022-06-29 VITALS
BODY MASS INDEX: 45.99 KG/M2 | OXYGEN SATURATION: 97 % | DIASTOLIC BLOOD PRESSURE: 8 MMHG | HEIGHT: 67 IN | SYSTOLIC BLOOD PRESSURE: 132 MMHG | HEART RATE: 90 BPM | WEIGHT: 293 LBS

## 2022-06-29 DIAGNOSIS — M79.604 LOW BACK PAIN RADIATING TO RIGHT LOWER EXTREMITY: Primary | ICD-10-CM

## 2022-06-29 DIAGNOSIS — G89.29 CHRONIC RIGHT SACROILIAC JOINT PAIN: ICD-10-CM

## 2022-06-29 DIAGNOSIS — M79.604 LOW BACK PAIN RADIATING TO RIGHT LOWER EXTREMITY: ICD-10-CM

## 2022-06-29 DIAGNOSIS — M53.3 CHRONIC RIGHT SACROILIAC JOINT PAIN: ICD-10-CM

## 2022-06-29 DIAGNOSIS — M54.50 LOW BACK PAIN RADIATING TO RIGHT LOWER EXTREMITY: Primary | ICD-10-CM

## 2022-06-29 DIAGNOSIS — M54.50 LOW BACK PAIN RADIATING TO RIGHT LOWER EXTREMITY: ICD-10-CM

## 2022-06-29 PROBLEM — M54.16 LUMBAR RADICULOPATHY: Status: ACTIVE | Noted: 2022-06-29

## 2022-06-29 PROCEDURE — 99244 OFF/OP CNSLTJ NEW/EST MOD 40: CPT | Performed by: ANESTHESIOLOGY

## 2022-06-29 PROCEDURE — 72100 X-RAY EXAM L-S SPINE 2/3 VWS: CPT

## 2022-06-29 RX ORDER — METHYLPREDNISOLONE ACETATE 80 MG/ML
80 INJECTION, SUSPENSION INTRA-ARTICULAR; INTRALESIONAL; INTRAMUSCULAR; PARENTERAL; SOFT TISSUE ONCE
Status: CANCELLED | OUTPATIENT
Start: 2022-06-29 | End: 2022-06-29

## 2022-06-29 RX ORDER — LIDOCAINE HYDROCHLORIDE 10 MG/ML
5 INJECTION, SOLUTION EPIDURAL; INFILTRATION; INTRACAUDAL; PERINEURAL ONCE
Status: CANCELLED | OUTPATIENT
Start: 2022-06-29 | End: 2022-06-29

## 2022-06-29 RX ORDER — MELOXICAM 7.5 MG/1
7.5 TABLET ORAL 2 TIMES DAILY PRN
Qty: 60 TABLET | Refills: 0 | Status: SHIPPED | OUTPATIENT
Start: 2022-06-29 | End: 2022-07-18 | Stop reason: SDUPTHER

## 2022-06-29 RX ORDER — BUPIVACAINE HCL/PF 2.5 MG/ML
4 VIAL (ML) INJECTION ONCE
Status: CANCELLED | OUTPATIENT
Start: 2022-06-29 | End: 2022-06-29

## 2022-06-29 NOTE — LETTER
June 30, 2022     Tomy Rodas DO  [address] [facility] [address]    Patient: Cesia Jack   YOB: 1995   Date of Visit: 6/29/2022       Dear Dr Lan Petty: Thank you for referring Cesia Jack to me for evaluation  Below are my notes for this consultation  If you have questions, please do not hesitate to call me  I look forward to following your patient along with you  Sincerely,        Macy Wilson MD        CC: No Recipients  Macy Wilson MD  6/29/2022 12:37 PM  Signed      Assessment  1  Low back pain radiating to right lower extremity - Right  -     Ambulatory Referral to Orthopedic Surgery  -     X-ray lumbar spine 2 or 3 views; Future; Expected date: 06/29/2022    2  Chronic right sacroiliac joint pain  -     meloxicam (MOBIC) 7 5 mg tablet; Take 1 tablet (7 5 mg total) by mouth 2 (two) times a day as needed for moderate pain  -     Ambulatory referral to Physical Therapy; Future    Axial right low back pain described primarily by arthritic features  Aching, nagging, indolent, stabbing, throbbing features in axial low back without radicular components  5/5 strength bilaterally, negative SLR  Positive right sided SIJ loading maneuvers +Gaenslen's + TERRIE's test, compression maneuvers, positive tenderness to palpation over lumbar paraspinal muscles  Currently she is neurologically intact without gait instability, saddle anesthesia or bowel/bladder abnormality  Risks, benefits alternative to SIJ injection thoroughly discussed with patient  Lifestyle modifications extensively discussed including diet, exercise and weight loss and control of DM-2  Handouts provided questions answered to patient satisfaction  Plan  -right sacroiliac joint injection; f/u 2 weeks post procedure  -xray lumbar spine; will f/u result with the patient  -Meloxicam 7 5 mg b i d  prn pain prescribed    Patient educated regarding pregnancy and  bleeding risk of taking this medication as well as not taking any other nonsteroidal anti-inflammatory medications while taking this medication; counseled thoroughly regarding potential risk of Cardiovascular injury, Kidney injury, Gastrointestinal ulceration/bleeding  Patient voiced understanding  -script provided for formal physical therapy for right-sided SIJ dysfunction; Physician directed home exercise plan as per [employer] demonstrated and handouts provided that patient plans to participate with for 1 hour, twice a week for the next 6 weeks  There are risks associated with opioid medications, including dependence, addiction and tolerance  The patient understands and agrees to use these medications only as prescribed  Potential side effects of the medications include, but are not limited to, constipation, drowsiness, addiction, impaired judgment and risk of fatal overdose if not taken as prescribed  The patient was warned against driving while taking sedation medications or operating heavy machinery  The patient voiced understanding  Sharing medications is a felony  At this point in time, the patient is showing no signs of addiction, abuse, diversion or suicidal ideation  [place] Prescription Drug Monitoring Program report was reviewed and was appropriate      Complete risks and benefits including bleeding, infection, tissue reaction, nerve injury and allergic reaction were discussed  The approach was demonstrated using models and literature was provided  Verbal and written consent was obtained  My impressions and treatment recommendations were discussed in detail with the patient who verbalized understanding and had no further questions  Discharge instructions were provided  I personally saw and examined the patient and I agree with the above discussed plan of care  My impressions and treatment recommendations were discussed in detail with the patient who verbalized understanding and had no further questions    Discharge instructions were provided  I personally saw and examined the patient and I agree with the above discussed plan of care  New Medications Ordered This Visit   Medications    meloxicam (MOBIC) 7 5 mg tablet     Sig: Take 1 tablet (7 5 mg total) by mouth 2 (two) times a day as needed for moderate pain     Dispense:  60 tablet     Refill:  0       History of Present Illness    Kelly Navarro is a 32 y o  female with pmhx of obesity, DM-2, XOL, HTN presenting with right sided low back pain with arthritic features  She describes 8/10 low back pain that is worse in the mornings and worse at the end of the day  The pain is characterized by achy, nagging, indolent, crampy, stabbing pain in her axial right low back  The patient describes that the pain is worse with standing for long periods of time on hard surfaces as well as with walking  The patient is a very active individual and feels as though this pain compromises her participation with independent activities of daily living  The pain can be debilitating at times and contribute to significant disability, compromising overall activity and independent activities of daily living  She has tried physical therapy with limited relief of symptoms  Medications the patient has tried in the past include flexeril, ibuprofen  She describes no radicular symptoms and has good strength  She denies any weakness numbness or paresthesias  The patient denies any bowel or bladder dysfunction as well  I have personally reviewed and/or updated the patient's past medical history, past surgical history, family history, social history, current medications, allergies, and vital signs today  Review of Systems   Constitutional: Positive for activity change  HENT: Negative  Eyes: Negative  Respiratory: Negative  Cardiovascular: Negative  Gastrointestinal: Negative  Endocrine: Negative  Genitourinary: Negative      Musculoskeletal: Positive for arthralgias, back pain, gait problem and myalgias  Skin: Negative  Allergic/Immunologic: Negative  Neurological: Negative for weakness and numbness  Hematological: Negative  Psychiatric/Behavioral: Negative  All other systems reviewed and are negative  Patient Active Problem List   Diagnosis    Low back pain radiating to right lower extremity - Right    Lumbar radiculopathy    Chronic right sacroiliac joint pain       Past Medical History:   Diagnosis Date    Asthma        Past Surgical History:   Procedure Laterality Date    HIP SURGERY         History reviewed  No pertinent family history  Social History     Occupational History    Not on file   Tobacco Use    Smoking status: Never Smoker    Smokeless tobacco: Never Used   Substance and Sexual Activity    Alcohol use: Never    Drug use: Never    Sexual activity: Not on file       Current Outpatient Medications on File Prior to Visit   Medication Sig    albuterol (Proventil HFA) 90 mcg/act inhaler Inhale 2 puffs every 6 (six) hours as needed for wheezing    atorvastatin (LIPITOR) 40 mg tablet Take 40 mg by mouth daily    hydrochlorothiazide (HYDRODIURIL) 12 5 mg tablet Take 12 5 mg by mouth daily    insulin glargine (LANTUS SOLOSTAR) 100 units/mL injection pen Inject 12 Units under the skin daily    lidocaine (Lidoderm) 5 % Apply 1 patch topically daily Remove & Discard patch within 12 hours or as directed by MD    losartan (COZAAR) 50 mg tablet Take 50 mg by mouth daily    metFORMIN (GLUCOPHAGE) 1000 MG tablet Take 1 tablet by mouth 2 (two) times a day with meals    [DISCONTINUED] cyclobenzaprine (FLEXERIL) 10 mg tablet Take 1 tablet (10 mg total) by mouth 2 (two) times a day as needed for muscle spasms    [DISCONTINUED] medroxyPROGESTERone (PROVERA) 10 mg tablet Take 10 mg by mouth every 30 (thirty) days (Patient not taking: Reported on 6/29/2022)     No current facility-administered medications on file prior to visit         No Known Allergies      Physical Exam    BP (!) 132/8 (BP Location: Left arm, Patient Position: Sitting, Cuff Size: Large)   Pulse 90   Ht 5' 7" (1 702 m)   Wt (!) 138 kg (305 lb)   SpO2 97%   BMI 47 77 kg/m²     Constitutional: normal, well developed, well nourished, alert, in no distress and non-toxic and no overt pain behavior  and obese  Eyes: anicteric  HEENT: grossly intact  Neck: supple, symmetric, trachea midline and no masses   Pulmonary:even and unlabored  Cardiovascular:No edema or pitting edema present  Skin:Normal without rashes or lesions and well hydrated  Psychiatric:Mood and affect appropriate  Neurologic:Cranial Nerves II-XII grossly intact Sensation grossly intact; no clonus negative borges's  Reflexes 2+ and brisk  SLR negative bilaterally  Spurling's maneuver negative bilaterally  Musculoskeletal:normal gait  5/5 strength bilaterally with AROM in all extremities  Normal heel toe and tip toe walking  Significant pain with lumbar facet loading bilaterally and with lateral spine rotation, right greater than left  TTP over lumbar paraspinal muscles   Positive right sided SIJ loading maneuvers +Gaenslen's + TERRIE's test, compression maneuvers    Imaging    No pertinent imaging to review at this time

## 2022-06-29 NOTE — TELEPHONE ENCOUNTER
Pt called back to schedule procedure 7/5/2022  Pt is wondering if she will be able to work the evening after procedure  She is works as security and spends shift sitting  Please advise  Thanks!

## 2022-06-29 NOTE — H&P (VIEW-ONLY)
Assessment  1  Low back pain radiating to right lower extremity - Right  -     Ambulatory Referral to Orthopedic Surgery  -     X-ray lumbar spine 2 or 3 views; Future; Expected date: 06/29/2022    2  Chronic right sacroiliac joint pain  -     meloxicam (MOBIC) 7 5 mg tablet; Take 1 tablet (7 5 mg total) by mouth 2 (two) times a day as needed for moderate pain  -     Ambulatory referral to Physical Therapy; Future    Axial right low back pain described primarily by arthritic features  Aching, nagging, indolent, stabbing, throbbing features in axial low back without radicular components  5/5 strength bilaterally, negative SLR  Positive right sided SIJ loading maneuvers +Gaenslen's + TERRIE's test, compression maneuvers, positive tenderness to palpation over lumbar paraspinal muscles  Currently she is neurologically intact without gait instability, saddle anesthesia or bowel/bladder abnormality  Risks, benefits alternative to SIJ injection thoroughly discussed with patient  Lifestyle modifications extensively discussed including diet, exercise and weight loss and control of DM-2  Handouts provided questions answered to patient satisfaction  Plan  -right sacroiliac joint injection; f/u 2 weeks post procedure  -xray lumbar spine; will f/u result with the patient  -Meloxicam 7 5 mg b i d  prn pain prescribed  Patient educated regarding pregnancy and  bleeding risk of taking this medication as well as not taking any other nonsteroidal anti-inflammatory medications while taking this medication; counseled thoroughly regarding potential risk of Cardiovascular injury, Kidney injury, Gastrointestinal ulceration/bleeding  Patient voiced understanding  -script provided for formal physical therapy for right-sided SIJ dysfunction; Physician directed home exercise plan as per [employer] demonstrated and handouts provided that patient plans to participate with for 1 hour, twice a week for the next 6 weeks       There are risks associated with opioid medications, including dependence, addiction and tolerance  The patient understands and agrees to use these medications only as prescribed  Potential side effects of the medications include, but are not limited to, constipation, drowsiness, addiction, impaired judgment and risk of fatal overdose if not taken as prescribed  The patient was warned against driving while taking sedation medications or operating heavy machinery  The patient voiced understanding  Sharing medications is a felony  At this point in time, the patient is showing no signs of addiction, abuse, diversion or suicidal ideation  [place] Prescription Drug Monitoring Program report was reviewed and was appropriate      Complete risks and benefits including bleeding, infection, tissue reaction, nerve injury and allergic reaction were discussed  The approach was demonstrated using models and literature was provided  Verbal and written consent was obtained  My impressions and treatment recommendations were discussed in detail with the patient who verbalized understanding and had no further questions  Discharge instructions were provided  I personally saw and examined the patient and I agree with the above discussed plan of care  My impressions and treatment recommendations were discussed in detail with the patient who verbalized understanding and had no further questions  Discharge instructions were provided  I personally saw and examined the patient and I agree with the above discussed plan of care  New Medications Ordered This Visit   Medications    meloxicam (MOBIC) 7 5 mg tablet     Sig: Take 1 tablet (7 5 mg total) by mouth 2 (two) times a day as needed for moderate pain     Dispense:  60 tablet     Refill:  0       History of Present Illness    Brendalyn Cowden is a 32 y o  female with pmhx of obesity, DM-2, XOL, HTN presenting with right sided low back pain with arthritic features   She describes 8/10 low back pain that is worse in the mornings and worse at the end of the day  The pain is characterized by achy, nagging, indolent, crampy, stabbing pain in her axial right low back  The patient describes that the pain is worse with standing for long periods of time on hard surfaces as well as with walking  The patient is a very active individual and feels as though this pain compromises her participation with independent activities of daily living  The pain can be debilitating at times and contribute to significant disability, compromising overall activity and independent activities of daily living  She has tried physical therapy with limited relief of symptoms  Medications the patient has tried in the past include flexeril, ibuprofen  She describes no radicular symptoms and has good strength  She denies any weakness numbness or paresthesias  The patient denies any bowel or bladder dysfunction as well  I have personally reviewed and/or updated the patient's past medical history, past surgical history, family history, social history, current medications, allergies, and vital signs today  Review of Systems   Constitutional: Positive for activity change  HENT: Negative  Eyes: Negative  Respiratory: Negative  Cardiovascular: Negative  Gastrointestinal: Negative  Endocrine: Negative  Genitourinary: Negative  Musculoskeletal: Positive for arthralgias, back pain, gait problem and myalgias  Skin: Negative  Allergic/Immunologic: Negative  Neurological: Negative for weakness and numbness  Hematological: Negative  Psychiatric/Behavioral: Negative  All other systems reviewed and are negative        Patient Active Problem List   Diagnosis    Low back pain radiating to right lower extremity - Right    Lumbar radiculopathy    Chronic right sacroiliac joint pain       Past Medical History:   Diagnosis Date    Asthma        Past Surgical History:   Procedure Laterality Date    HIP SURGERY         History reviewed  No pertinent family history  Social History     Occupational History    Not on file   Tobacco Use    Smoking status: Never Smoker    Smokeless tobacco: Never Used   Substance and Sexual Activity    Alcohol use: Never    Drug use: Never    Sexual activity: Not on file       Current Outpatient Medications on File Prior to Visit   Medication Sig    albuterol (Proventil HFA) 90 mcg/act inhaler Inhale 2 puffs every 6 (six) hours as needed for wheezing    atorvastatin (LIPITOR) 40 mg tablet Take 40 mg by mouth daily    hydrochlorothiazide (HYDRODIURIL) 12 5 mg tablet Take 12 5 mg by mouth daily    insulin glargine (LANTUS SOLOSTAR) 100 units/mL injection pen Inject 12 Units under the skin daily    lidocaine (Lidoderm) 5 % Apply 1 patch topically daily Remove & Discard patch within 12 hours or as directed by MD    losartan (COZAAR) 50 mg tablet Take 50 mg by mouth daily    metFORMIN (GLUCOPHAGE) 1000 MG tablet Take 1 tablet by mouth 2 (two) times a day with meals    [DISCONTINUED] cyclobenzaprine (FLEXERIL) 10 mg tablet Take 1 tablet (10 mg total) by mouth 2 (two) times a day as needed for muscle spasms    [DISCONTINUED] medroxyPROGESTERone (PROVERA) 10 mg tablet Take 10 mg by mouth every 30 (thirty) days (Patient not taking: Reported on 6/29/2022)     No current facility-administered medications on file prior to visit  No Known Allergies      Physical Exam    BP (!) 132/8 (BP Location: Left arm, Patient Position: Sitting, Cuff Size: Large)   Pulse 90   Ht 5' 7" (1 702 m)   Wt (!) 138 kg (305 lb)   SpO2 97%   BMI 47 77 kg/m²     Constitutional: normal, well developed, well nourished, alert, in no distress and non-toxic and no overt pain behavior   and obese  Eyes: anicteric  HEENT: grossly intact  Neck: supple, symmetric, trachea midline and no masses   Pulmonary:even and unlabored  Cardiovascular:No edema or pitting edema present  Skin:Normal without rashes or lesions and well hydrated  Psychiatric:Mood and affect appropriate  Neurologic:Cranial Nerves II-XII grossly intact Sensation grossly intact; no clonus negative borges's  Reflexes 2+ and brisk  SLR negative bilaterally  Spurling's maneuver negative bilaterally  Musculoskeletal:normal gait  5/5 strength bilaterally with AROM in all extremities  Normal heel toe and tip toe walking  Significant pain with lumbar facet loading bilaterally and with lateral spine rotation, right greater than left  TTP over lumbar paraspinal muscles   Positive right sided SIJ loading maneuvers +Gaenslen's + TERRIE's test, compression maneuvers    Imaging    No pertinent imaging to review at this time

## 2022-06-29 NOTE — PROGRESS NOTES
Assessment  1  Low back pain radiating to right lower extremity - Right  -     Ambulatory Referral to Orthopedic Surgery  -     X-ray lumbar spine 2 or 3 views; Future; Expected date: 06/29/2022    2  Chronic right sacroiliac joint pain  -     meloxicam (MOBIC) 7 5 mg tablet; Take 1 tablet (7 5 mg total) by mouth 2 (two) times a day as needed for moderate pain  -     Ambulatory referral to Physical Therapy; Future    Axial right low back pain described primarily by arthritic features  Aching, nagging, indolent, stabbing, throbbing features in axial low back without radicular components  5/5 strength bilaterally, negative SLR  Positive right sided SIJ loading maneuvers +Gaenslen's + TERRIE's test, compression maneuvers, positive tenderness to palpation over lumbar paraspinal muscles  Currently she is neurologically intact without gait instability, saddle anesthesia or bowel/bladder abnormality  Risks, benefits alternative to SIJ injection thoroughly discussed with patient  Lifestyle modifications extensively discussed including diet, exercise and weight loss and control of DM-2  Handouts provided questions answered to patient satisfaction  Plan  -right sacroiliac joint injection; f/u 2 weeks post procedure  -xray lumbar spine; will f/u result with the patient  -Meloxicam 7 5 mg b i d  prn pain prescribed  Patient educated regarding pregnancy and  bleeding risk of taking this medication as well as not taking any other nonsteroidal anti-inflammatory medications while taking this medication; counseled thoroughly regarding potential risk of Cardiovascular injury, Kidney injury, Gastrointestinal ulceration/bleeding  Patient voiced understanding  -script provided for formal physical therapy for right-sided SIJ dysfunction; Physician directed home exercise plan as per [facility] demonstrated and handouts provided that patient plans to participate with for 1 hour, twice a week for the next 6 weeks       There are risks associated with opioid medications, including dependence, addiction and tolerance  The patient understands and agrees to use these medications only as prescribed  Potential side effects of the medications include, but are not limited to, constipation, drowsiness, addiction, impaired judgment and risk of fatal overdose if not taken as prescribed  The patient was warned against driving while taking sedation medications or operating heavy machinery  The patient voiced understanding  Sharing medications is a felony  At this point in time, the patient is showing no signs of addiction, abuse, diversion or suicidal ideation  [place] Prescription Drug Monitoring Program report was reviewed and was appropriate      Complete risks and benefits including bleeding, infection, tissue reaction, nerve injury and allergic reaction were discussed  The approach was demonstrated using models and literature was provided  Verbal and written consent was obtained  My impressions and treatment recommendations were discussed in detail with the patient who verbalized understanding and had no further questions  Discharge instructions were provided  I personally saw and examined the patient and I agree with the above discussed plan of care  My impressions and treatment recommendations were discussed in detail with the patient who verbalized understanding and had no further questions  Discharge instructions were provided  I personally saw and examined the patient and I agree with the above discussed plan of care  New Medications Ordered This Visit   Medications    meloxicam (MOBIC) 7 5 mg tablet     Sig: Take 1 tablet (7 5 mg total) by mouth 2 (two) times a day as needed for moderate pain     Dispense:  60 tablet     Refill:  0       History of Present Illness    Marcial Melissa is a 32 y o  female with pmhx of obesity, DM-2, XOL, HTN presenting with right sided low back pain with arthritic features   She describes 8/10 low back pain that is worse in the mornings and worse at the end of the day  The pain is characterized by achy, nagging, indolent, crampy, stabbing pain in her axial right low back  The patient describes that the pain is worse with standing for long periods of time on hard surfaces as well as with walking  The patient is a very active individual and feels as though this pain compromises her participation with independent activities of daily living  The pain can be debilitating at times and contribute to significant disability, compromising overall activity and independent activities of daily living  She has tried physical therapy with limited relief of symptoms  Medications the patient has tried in the past include flexeril, ibuprofen  She describes no radicular symptoms and has good strength  She denies any weakness numbness or paresthesias  The patient denies any bowel or bladder dysfunction as well  I have personally reviewed and/or updated the patient's past medical history, past surgical history, family history, social history, current medications, allergies, and vital signs today  Review of Systems   Constitutional: Positive for activity change  HENT: Negative  Eyes: Negative  Respiratory: Negative  Cardiovascular: Negative  Gastrointestinal: Negative  Endocrine: Negative  Genitourinary: Negative  Musculoskeletal: Positive for arthralgias, back pain, gait problem and myalgias  Skin: Negative  Allergic/Immunologic: Negative  Neurological: Negative for weakness and numbness  Hematological: Negative  Psychiatric/Behavioral: Negative  All other systems reviewed and are negative        Patient Active Problem List   Diagnosis    Low back pain radiating to right lower extremity - Right    Lumbar radiculopathy    Chronic right sacroiliac joint pain       Past Medical History:   Diagnosis Date    Asthma        Past Surgical History:   Procedure Laterality Date    HIP SURGERY         History reviewed  No pertinent family history  Social History     Occupational History    Not on file   Tobacco Use    Smoking status: Never Smoker    Smokeless tobacco: Never Used   Substance and Sexual Activity    Alcohol use: Never    Drug use: Never    Sexual activity: Not on file       Current Outpatient Medications on File Prior to Visit   Medication Sig    albuterol (Proventil HFA) 90 mcg/act inhaler Inhale 2 puffs every 6 (six) hours as needed for wheezing    atorvastatin (LIPITOR) 40 mg tablet Take 40 mg by mouth daily    hydrochlorothiazide (HYDRODIURIL) 12 5 mg tablet Take 12 5 mg by mouth daily    insulin glargine (LANTUS SOLOSTAR) 100 units/mL injection pen Inject 12 Units under the skin daily    lidocaine (Lidoderm) 5 % Apply 1 patch topically daily Remove & Discard patch within 12 hours or as directed by MD    losartan (COZAAR) 50 mg tablet Take 50 mg by mouth daily    metFORMIN (GLUCOPHAGE) 1000 MG tablet Take 1 tablet by mouth 2 (two) times a day with meals    [DISCONTINUED] cyclobenzaprine (FLEXERIL) 10 mg tablet Take 1 tablet (10 mg total) by mouth 2 (two) times a day as needed for muscle spasms    [DISCONTINUED] medroxyPROGESTERone (PROVERA) 10 mg tablet Take 10 mg by mouth every 30 (thirty) days (Patient not taking: Reported on 6/29/2022)     No current facility-administered medications on file prior to visit  No Known Allergies      Physical Exam    BP (!) 132/8 (BP Location: Left arm, Patient Position: Sitting, Cuff Size: Large)   Pulse 90   Ht 5' 7" (1 702 m)   Wt (!) 138 kg (305 lb)   SpO2 97%   BMI 47 77 kg/m²     Constitutional: normal, well developed, well nourished, alert, in no distress and non-toxic and no overt pain behavior   and obese  Eyes: anicteric  HEENT: grossly intact  Neck: supple, symmetric, trachea midline and no masses   Pulmonary:even and unlabored  Cardiovascular:No edema or pitting edema present  Skin:Normal without rashes or lesions and well hydrated  Psychiatric:Mood and affect appropriate  Neurologic:Cranial Nerves II-XII grossly intact Sensation grossly intact; no clonus negative borges's  Reflexes 2+ and brisk  SLR negative bilaterally  Spurling's maneuver negative bilaterally  Musculoskeletal:normal gait  5/5 strength bilaterally with AROM in all extremities  Normal heel toe and tip toe walking  Significant pain with lumbar facet loading bilaterally and with lateral spine rotation, right greater than left  TTP over lumbar paraspinal muscles   Positive right sided SIJ loading maneuvers +Gaenslen's + TERRIE's test, compression maneuvers    Imaging    No pertinent imaging to review at this time

## 2022-06-29 NOTE — LETTER
June 30, 2022     Patient: Clarence Park  YOB: 1995  Date of Visit: 6/29/2022      To Whom it May Concern:    Clarence Park is under my professional care  Julia Mak was seen in my office on 6/29/2022  Julia Mak is scheduled for a medical procedure 7/5/22; she may return to work 24 hours after her procedure  If you have any questions or concerns, please don't hesitate to call           Sincerely,          aYmileth Gerardo MD      CC: No Recipients

## 2022-06-29 NOTE — PATIENT INSTRUCTIONS
Core Strengthening Exercises   WHAT YOU NEED TO KNOW:   Your core includes the muscles of your lower back, hip, pelvis, and abdomen  Core strengthening exercises help heal and strengthen these muscles  This helps prevent another injury, and keeps your pelvis, spine, and hips in the correct position  DISCHARGE INSTRUCTIONS:   Contact your healthcare provider if:   You have sharp or worsening pain during exercise or at rest     You have questions or concerns about your shoulder exercises  Safety tips:  Talk to your healthcare provider before you start an exercise program  A physical therapist can teach you how to do core strengthening exercises safely  Do the exercises on a mat or firm surface  A firm surface will support your spine and prevent low back pain  Do not do these exercises on a bed  Move slowly and smoothly  Avoid fast or jerky motions  Stop if you feel pain  Core exercises should not be painful  Stop if you feel pain  Breathe normally during core exercises  Do not hold your breath  This may cause an increase in blood pressure and prevent muscle strengthening  Your healthcare provider will tell you when to inhale and exhale during the exercise  Begin all of your exercises with abdominal bracing  Abdominal bracing helps warm up your core muscles  You can also practice abdominal bracing throughout the day  Lie on your back with your knees bent and feet flat on the floor  Place your arms in a relaxed position beside your body  Tighten your abdominal muscles  Pull your belly button in and up toward your spine  Hold for 5 seconds  Relax your muscles  Repeat 10 times  Core strengthening exercises: Your healthcare provider will tell you how often to do these exercises  The provider will also tell you how many repetitions of each exercise you should do  Hold each exercise for 5 seconds or as directed  As you get stronger, increase your hold to 10 to 15 seconds   You can do some of these exercises on a stability ball, or with a weight  Ask your healthcare provider how to use a stability ball or weight for these exercises:  Bridging:  Lie on your back with your knees bent and feet flat on the floor  Rest your arms at your side  Tighten your buttocks, and then lift your hips 1 inch off the floor  Hold for 5 seconds  When you can do this exercise without pain for 10 seconds, increase the distance you lift your hips  A good goal is to be able to lift your hips so that your shoulders, hips, and knees are in a straight line  Dead bug:  Lie on your back with your knees bent and feet flat on the floor  Place your arms in a relaxed position beside your body  Begin with abdominal bracing  Next, raise one leg, keeping your knee bent  Hold for 5 seconds  Repeat with the other leg  When you can do this exercise without pain for 10 to 15 seconds, you may raise one straight leg and hold  Repeat with the other leg  Quadruped:  Place your hands and knees on the floor  Keep your wrists directly below your shoulders and your knees directly below your hips  Pull your belly button in toward your spine  Do not flatten or arch your back  Tighten your abdominal muscles below your belly button  Hold for 5 seconds  When you can do this exercise without pain for 10 to 15 seconds, you may extend one arm and hold  Repeat on the other side  Side bridge exercises:      Standing side bridge:  Stand next to a wall and extend one arm toward the wall  Place your palm flat on the wall with your fingers pointing upward  Begin with abdominal bracing  Next, without moving your feet, slowly bend your arm to 90 degrees  Hold for 5 seconds  Repeat on the other side  When you can do this exercise without pain for 10 to 15 seconds, you may do the bent leg side bridge on the floor  Bent leg side bridge:  Lie on one side with your legs, hips, and shoulders in a straight line   Prop yourself up onto your forearm so your elbow is directly below your shoulder  Bend your knees back to 90 degrees  Begin with abdominal bracing  Next, lift your hips and balance yourself on your forearm and knees  Hold for 5 seconds  Repeat on the other side  When you can do this exercise without pain for 10 to 15 seconds, you may do the straight leg side bridge on the floor  Straight leg side bridge:  Lie on one side with your legs, hips, and shoulders in a straight line  Prop yourself up onto your forearm so your elbow is directly below your shoulder  Begin with abdominal bracing  Lift your hips off the floor and balance yourself on your forearm and the outside of your flexed foot  Do not let your ankle bend sideways  Hold for 5 seconds  Repeat on the other side  When you can do this exercise without pain for 10 to 15 seconds, ask your healthcare provider for more advanced exercises  Superman:  Lie on your stomach  Extend your arms forward on the floor  Tighten your abdominal muscles and lift your right hand and left leg off the floor  Hold this position  Slowly return to the starting position  Tighten your abdominal muscles and lift your left hand and right leg off the floor  Hold this position  Slowly return to the starting position  Clam:  Lie on your side with your knees bent  Put your bottom arm under your head to keep your neck in line  Put your top hand on your hip to keep your pelvis from moving  Put your heels together, and keep them together during this exercise  Slowly raise your top knee toward the ceiling  Then lower your leg so your knees are together  Repeat this exercise 10 times  Then switch sides and do the exercise 10 times with the other leg  Curl up:  Lie on your back with your knees bent and feet flat on the floor  Place your hands, palms down, underneath your lower back  Next, with your elbows on the floor, lift your shoulders and chest 2 to 3 inches off the floor   Keep your head in line with your shoulders  Hold this position  Slowly return to the starting position  Straight leg raises:  Lie on your back with one leg straight  Bend the other knee and place your foot flat on the floor  Tighten your abdominal muscles  Keep your leg straight and slowly lift it straight up 6 to 12 inches off the floor  Hold this position  Lower your leg slowly  Do as many repetitions as directed on this side  Repeat with the other leg  Plank:  Lie on your stomach  Bend your elbows and place your forearms flat on the floor  Lift your chest, stomach, and knees off the floor  Make sure your elbows are below your shoulders  Your body should be in a straight line  Do not let your hips or lower back sink to the ground  Squeeze your abdominal muscles together and hold for 15 seconds  To make this exercise harder, hold for 30 seconds or lift 1 leg at a time  Bicycles:  Lie on your back  Bend both knees and bring them toward your chest  Your calves should be parallel to the floor  Place the palms of your hands on the back of your head  Straighten your right leg and keep it lifted 2 inches off the floor  Raise your head and shoulders off the floor and twist towards your left  Keep your head and shoulders lifted  Bend your right knee while you straighten your left leg  Keep your left leg 2 inches off the floor  Twist your head and chest towards the left leg  Continue to straighten 1 leg at a time and twist        Follow up with your doctor as directed:  Write down your questions so you remember to ask them during your visits  © Copyright [employer] 2022 Information is for End User's use only and may not be sold, redistributed or otherwise used for commercial purposes  All illustrations and images included in CareNotes® are the copyrighted property of [employer]  or [facility] [employer]   The above information is an  only   It is not intended as medical advice for individual conditions or treatments  Talk to your doctor, nurse or pharmacist before following any medical regimen to see if it is safe and effective for you

## 2022-07-05 ENCOUNTER — APPOINTMENT (OUTPATIENT)
Dept: RADIOLOGY | Facility: HOSPITAL | Age: 27
End: 2022-07-05
Payer: COMMERCIAL

## 2022-07-05 ENCOUNTER — HOSPITAL ENCOUNTER (OUTPATIENT)
Facility: HOSPITAL | Age: 27
Setting detail: OUTPATIENT SURGERY
Discharge: HOME | End: 2022-07-05
Attending: ANESTHESIOLOGY | Admitting: ANESTHESIOLOGY
Payer: COMMERCIAL

## 2022-07-05 VITALS
DIASTOLIC BLOOD PRESSURE: 75 MMHG | BODY MASS INDEX: 45.99 KG/M2 | RESPIRATION RATE: 20 BRPM | WEIGHT: 293 LBS | OXYGEN SATURATION: 100 % | TEMPERATURE: 98 F | SYSTOLIC BLOOD PRESSURE: 135 MMHG | HEART RATE: 82 BPM | HEIGHT: 67 IN

## 2022-07-05 LAB
EXT PREGNANCY TEST URINE: NEGATIVE
EXT. CONTROL: NORMAL
GLUCOSE SERPL-MCNC: 136 MG/DL (ref 65–140)

## 2022-07-05 PROCEDURE — 82948 REAGENT STRIP/BLOOD GLUCOSE: CPT

## 2022-07-05 PROCEDURE — 81025 URINE PREGNANCY TEST: CPT | Performed by: ANESTHESIOLOGY

## 2022-07-05 PROCEDURE — A9585 GADOBUTROL INJECTION: HCPCS | Performed by: ANESTHESIOLOGY

## 2022-07-05 PROCEDURE — 27096 INJECT SACROILIAC JOINT: CPT | Performed by: ANESTHESIOLOGY

## 2022-07-05 RX ORDER — LIDOCAINE HYDROCHLORIDE 10 MG/ML
INJECTION, SOLUTION EPIDURAL; INFILTRATION; INTRACAUDAL; PERINEURAL AS NEEDED
Status: DISCONTINUED | OUTPATIENT
Start: 2022-07-05 | End: 2022-07-05 | Stop reason: HOSPADM

## 2022-07-05 RX ORDER — ALPRAZOLAM 0.5 MG/1
0.5 TABLET ORAL ONCE
Status: DISCONTINUED | OUTPATIENT
Start: 2022-07-05 | End: 2022-07-05 | Stop reason: HOSPADM

## 2022-07-05 RX ORDER — METHYLPREDNISOLONE ACETATE 80 MG/ML
INJECTION, SUSPENSION INTRA-ARTICULAR; INTRALESIONAL; INTRAMUSCULAR; SOFT TISSUE AS NEEDED
Status: DISCONTINUED | OUTPATIENT
Start: 2022-07-05 | End: 2022-07-05 | Stop reason: HOSPADM

## 2022-07-05 NOTE — DISCHARGE INSTRUCTIONS
YOUR 2 WEEK FOLLOW UP HAS BEEN SCHEDULED; IF YOU WISH TO CHANGE THE FOLLOW UP, PLEASE CALL THE [facility] AT [facility]: [phone]  Sacroiliac Joint Injection   WHAT YOU NEED TO KNOW:   A sacroiliac (SI) joint injection is done to diagnose or treat pain from sacroiliac joint syndrome  The pain caused by this syndrome may be felt in your lower back, buttocks, groin, and your thigh  DISCHARGE INSTRUCTIONS:   Seek care immediately if:   You have a fever  You have increased redness, or swelling around the injection site  You have drainage from the injection site  You are not able to walk or move your leg  Contact your healthcare provider if:   Your pain does not get better within 5 days  You have new symptoms  You have questions or concerns about your condition or care  Self-care:   Drink liquids as directed  Liquids will help flush the contrast material out of your body  Ask how much liquid to drink and which liquids are best for you  Apply ice to your injection site  Ice helps decrease swelling and pain  Use an ice pack, or put crushed ice in a plastic bag  Cover it with a towel and place it on your low back for 15 to 20 minutes every hour or as directed  Do not take a bath or get into a hot tub after your procedure  Take a shower instead  Soaking your puncture site in a bath or hot tub increases your risk for infection  Limit physical activity that causes pain  Rest as needed  Ask your healthcare provider how long you should limit activity  Keep a pain diary  Write down when your pain happens, how severe it is, and any other symptoms you have with your pain  A diary will help you keep track of your pain  It may also help your healthcare provider find out what is causing your pain  Follow up with your healthcare provider as directed: You may need more injections or other treatments  Bring your pain diary to your visits   Write down your questions so you remember to ask them during your visits  © Copyright [employer] 2022 Information is for End User's use only and may not be sold, redistributed or otherwise used for commercial purposes  All illustrations and images included in CareNotes® are the copyrighted property of [employer]  or [employer]  The above information is an  only  It is not intended as medical advice for individual conditions or treatments  Talk to your doctor, nurse or pharmacist before following any medical regimen to see if it is safe and effective for you

## 2022-07-05 NOTE — OP NOTE
OPERATIVE REPORT  PATIENT NAME: Maame Arnold    :  1995  MRN: [id number]  Pt Location:  GI ROOM 01    SURGERY DATE: 2022    Surgeon(s) and Role: Howard Graham MD - Primary    Preop Diagnosis:  Chronic right sacroiliac joint pain [M53 3, G89 29]    Post-Op Diagnosis Codes:     * Chronic right sacroiliac joint pain [M53 3, G89 29]    Procedure(s) (LRB):  BLOCK / INJECTION SACROILIAC (Right)    Specimen(s):  * No specimens in log *    Estimated Blood Loss:   Minimal    Drains:  * No LDAs found *    Anesthesia Type:   Local    Operative Indications:  Chronic right sacroiliac joint pain [M53 3, G89 29]    Operative Findings:  GADVIST contrast spread into right Sacroiliac joint into fluoroscopic guidance      Complications:   none    Procedure and Technique:  Please see detailed procedure note     I was present for the entire procedure    Patient Disposition:  PACU       SIGNATURE: Estelle Alpers, MD  DATE: 2022  TIME: 10:40 AM

## 2022-07-05 NOTE — INTERVAL H&P NOTE
H&P reviewed  After examining the patient I find no changes in the patients condition since the H&P had been written      Vitals:    07/05/22 0958   BP: 158/79   Pulse: 100   Resp: 20   Temp: 97 7 °F (36 5 °C)   SpO2: 99%

## 2022-07-05 NOTE — PROCEDURES
Pre-procedure Diagnosis: Sacroiliitis/Sacroiliac joint dysfunction  Post-procedure Diagnosis: Sacroiliitis/Sacroiliac joint dysfunction  Operation Title(s):  1  [RIGHT] Sacroiliac joint injection      2  Intraoperative fluoroscopy  Attending Surgeon:   Xochilt Reza MD  Anesthesia:   Local    Indications: The patient is a 32y o  year-old female with a diagnosis of RIGHT sacroiliitis/Sacroiliac joint dysfunction  The patient's history and physical exam were reviewed  The risks, benefits and alternatives to the procedure were discussed, and all questions were answered to the patient's satisfaction  The patient agreed to proceed, and written informed consent was obtained  Procedure in Detail: The patient was brought into the procedure room and placed in the prone position on the fluoroscopy table  The low back and upper buttock were prepped with chloraprep and draped in a sterile manner  AP fluoroscopy was used to visualize the [RIGHT] sacroiliac joint  The fluoroscopic beam was then obliqued until the anterior and posterior margins of the joint were aligned  The inferior margin of the joint was identified and marked  The skin and subcutaneous tissues in the area were anesthetized with 1% lidocaine  A 22-gauge, 3½-inch spinal needle was advanced toward the identified point under fluoroscopic guidance  Once the targeted point was reached and the joint space was entered, negative aspiration was confirmed  Then, after negative aspiration, a solution consisting of 4mL 0 25% bupivacaine and 1mL Depo-Medrol (80mg/ml) were easily injected  The needle was removed with a 1% lidocaine flush  The patient's back was cleaned and a bandage was placed over the sites of needle insertion  Disposition: The patient tolerated the procedure well and there were no apparent complications  Vital signs remained stable throughout the procedure   The patient was taken to the recovery area where written discharge instructions for the procedure were given      Estimated Blood Loss: None  Specimens Obtained: N/A

## 2022-07-12 ENCOUNTER — TELEPHONE (OUTPATIENT)
Dept: PAIN MEDICINE | Facility: CLINIC | Age: 27
End: 2022-07-12

## 2022-07-12 NOTE — PROGRESS NOTES
PT Evaluation     Today's date: 2022  Patient name: Leeanne Lazo  : 1995  MRN: [id number]  Referring provider: Christa Anne MD  Dx:   Encounter Diagnosis     ICD-10-CM    1  Lumbar radiculopathy  M54 16    2  Chronic right sacroiliac joint pain  M53 3 Ambulatory referral to Physical Therapy    G89 29                   Assessment  Assessment details: PT notes the patient with decrease ROM and strength t/o the lumbar spine as well as the bilateral hip and LE with trunk/core weakness leading to increase pain levels and functional limitations with need for course of skilled therapy for 6 weeks with focus on lumbar stabilization, manual therapy, posture, analgesic modalities, and HEP  Impairments: abnormal or restricted ROM, activity intolerance, impaired physical strength, lacks appropriate home exercise program and pain with function  Understanding of Dx/Px/POC: good   Prognosis: good    Goals  ST  Initiate HEP  2  Decrease pain levels by 25-50%   3  Increase ROM by 25-50%   4  Increase strength by 1-2 mm grades  LT  Improve spinal stability with increase trunk/core strength   2  Decrease limitations with standing, walking, and stairs  3  Decrease limitations with trunk movement, lifting, and carrying  4  Decrease limitations with ADL  5   DC with HEP    Plan  Plan details: PT notes review of POC and findings with the patient who is in agreement with PT recommendations of skilled therapy     Patient would benefit from: PT eval and skilled physical therapy  Planned modality interventions: thermotherapy: hydrocollator packs  Planned therapy interventions: manual therapy, neuromuscular re-education, patient education, self care, strengthening, stretching, therapeutic exercise, flexibility, graded exercise and home exercise program  Frequency: 2x week  Duration in weeks: 6  Treatment plan discussed with: patient        Subjective Evaluation    History of Present Illness  Mechanism of injury: Patient reports development of LBP about 3 months ago from insidious onset with progressive worsening over time leading to limitations with sitting, standing, walking, stairs, transfers, lifting, carrying, and ADL  Patient reports she is employed [employer] as   Patient reports she went to pain management MD for evaluation and received a series of injection in the SI joints with 75% improvement since the injection  Patient was referred to OPPT to address Lumbar issues  Patient states significant PMH of right femur fracture repair, HTN, and DM  Pain  Current pain ratin  At best pain ratin  At worst pain ratin  Location: Lumbar spine   Quality: dull ache and discomfort  Relieving factors: medications and change in position  Aggravating factors: lifting, stair climbing, walking and standing      Diagnostic Tests  X-ray: normal  Treatments  Current treatment: injection treatment, medication and physical therapy  Patient Goals  Patient goals for therapy: decreased pain, increased motion, increased strength, independence with ADLs/IADLs and return to sport/leisure activities          Objective     Postural Observations  Seated posture: fair  Standing posture: fair        Palpation   Left   Muscle spasm in the erector spinae and lumbar paraspinals  Tenderness of the erector spinae and lumbar paraspinals  Right   Muscle spasm in the erector spinae, lumbar paraspinals and quadratus lumborum  Tenderness of the erector spinae, lumbar paraspinals and quadratus lumborum  Tenderness     Left Hip   No tenderness in the PSIS and sacroiliac joint  Right Hip   Tenderness in the PSIS and sacroiliac joint       Neurological Testing     Reflexes   Left   Patellar (L4): trace (1+)  Achilles (S1): trace (1+)    Right   Patellar (L4): trace (1+)  Achilles (S1): trace (1+)    Active Range of Motion     Lumbar   Flexion: 47 degrees  with pain  Extension: 24 degrees  with pain  Right lateral flexion: 9 degrees  with pain  Left rotation: 8 degrees   Right rotation: 14 degrees   Left Hip   Flexion: 64 degrees   Abduction: [facility]  External rotation (90/90): 16 degrees   Internal rotation (90/90): 5 degrees     Right Hip   Flexion: 51 degrees with pain  Abduction: [facility]  External rotation (90/90): 14 degrees with pain  Internal rotation (90/90): 0 degrees with pain    Additional Active Range of Motion Details  PT notes decrease ROM and strength t/o the lumbar spine with trunk/core weakness with abdominals of 2+/5 and lumbar paraspinals of 3/5   PT notes decrease ROM and strength t/o the bilateral hip and LE     Passive Range of Motion   Left Hip   Flexion: 74 degrees     Right Hip   Flexion: 67 degrees     Strength/Myotome Testing     Left Hip   Normal muscle strength    Right Hip   Planes of Motion   Flexion: 4  Extension: 4+  Abduction: 4+  Adduction: 5  External rotation: 4-  Internal rotation: 4-    Tests     Right Hip   Negative TERRIE, MONO and long sit  Abdullahi: Negative  SLR: Positive  Ambulation     Observational Gait   Gait: within functional limits   Walking speed and stride length within functional limits                Precautions:  HTN, DM       Manuals 7/13       Bilateral HS, quad, piriformis, hip ABD, and gastroc with manual hip traction                                 Neuro Re-Ed        Seated Tball 3 way trunk flexion         Stand trunk extension against bar        Stand OAL        Supine Tball ABD crunch         PPT        PPT with hip march                 Ther Ex        Treadmill         S/L Clam shells        S/L reverse clam shells         Bridge and with ABD        Side step and squat        Monster walk                 HEP update/review  15 min        Ther Activity                        Gait Training                        Modalities        MHP  PRN

## 2022-07-13 ENCOUNTER — EVALUATION (OUTPATIENT)
Dept: PHYSICAL THERAPY | Facility: CLINIC | Age: 27
End: 2022-07-13
Payer: COMMERCIAL

## 2022-07-13 DIAGNOSIS — G89.29 CHRONIC RIGHT SACROILIAC JOINT PAIN: ICD-10-CM

## 2022-07-13 DIAGNOSIS — M53.3 CHRONIC RIGHT SACROILIAC JOINT PAIN: ICD-10-CM

## 2022-07-13 DIAGNOSIS — M54.16 LUMBAR RADICULOPATHY: Primary | ICD-10-CM

## 2022-07-13 PROCEDURE — 97535 SELF CARE MNGMENT TRAINING: CPT | Performed by: PHYSICAL THERAPIST

## 2022-07-13 PROCEDURE — 97162 PT EVAL MOD COMPLEX 30 MIN: CPT | Performed by: PHYSICAL THERAPIST

## 2022-07-14 NOTE — PROGRESS NOTES
Daily Note     Today's date: 7/15/2022  Patient name: Mikhail Penn  : 1995  MRN: [id number]  Referring provider: Nikki Garcia MD  Dx:   Encounter Diagnosis     ICD-10-CM    1  Chronic right sacroiliac joint pain  M53 3     G89 29    2  Lumbar radiculopathy  M54 16                   Subjective:  Patient reports minimal soreness in the lumbar spine t/o the session  Patient states she is compliant with HEP  Objective: See treatment diary below      Assessment: Tolerated treatment well  PT notes start of POC with focus on lumbar stabilization and manual therapy to decrease symptoms and improve functional limitations to meet therapy goals  PT notes continuation of trunk/core weakness with decrease ROM and strength t/o the lumbar spine with need for continuation of skilled therapy  Plan: Continue per plan of care        Precautions:  HTN, DM       Manuals 7/13 7/15      Bilateral HS, quad, piriformis, hip ABD, and gastroc with manual hip traction   15 min                               Neuro Re-Ed        Seated Tball 3 way trunk flexion         Stand trunk extension against bar        Stand OAL        Supine Tball ABD crunch   10x3" Hold       PPT  10x5" Hold       PPT with hip march   10x Bilat               Ther Ex        Treadmill   10 min   1 0 MPH       S/L Clam shells  2x10 Bilat  Red       S/L reverse clam shells         Bridge and with ABD  2x10 and with ABD Red       Side step and squat        Monster walk                 HEP update/review  15 min        Ther Activity                        Gait Training                        Modalities        MHP  PRN  Declined

## 2022-07-15 ENCOUNTER — OFFICE VISIT (OUTPATIENT)
Dept: PHYSICAL THERAPY | Facility: CLINIC | Age: 27
End: 2022-07-15
Payer: COMMERCIAL

## 2022-07-15 DIAGNOSIS — M53.3 CHRONIC RIGHT SACROILIAC JOINT PAIN: Primary | ICD-10-CM

## 2022-07-15 DIAGNOSIS — G89.29 CHRONIC RIGHT SACROILIAC JOINT PAIN: Primary | ICD-10-CM

## 2022-07-15 DIAGNOSIS — M54.16 LUMBAR RADICULOPATHY: ICD-10-CM

## 2022-07-15 PROCEDURE — 97140 MANUAL THERAPY 1/> REGIONS: CPT | Performed by: PHYSICAL THERAPIST

## 2022-07-15 PROCEDURE — 97110 THERAPEUTIC EXERCISES: CPT | Performed by: PHYSICAL THERAPIST

## 2022-07-15 PROCEDURE — 97112 NEUROMUSCULAR REEDUCATION: CPT | Performed by: PHYSICAL THERAPIST

## 2022-07-15 NOTE — PROGRESS NOTES
Daily Note     Today's date: 2022  Patient name: Svitlana Hamilton  : 1995  MRN: [id number]  Referring provider: Dallas Gomes MD  Dx:   Encounter Diagnosis     ICD-10-CM    1  Chronic right sacroiliac joint pain  M53 3     G89 29    2  Lumbar radiculopathy  M54 16                   Subjective: "I woke up with my back hurting yesterday  Today it is real sore on the R side of my low back "      Objective: See treatment diary below      Assessment: Tolerated treatment fair  Patient exhibited good technique with therapeutic exercises and would benefit from continued PT to increase lumbar ROM/strength and endurance to improve mobility  Increased spasm/tightness palpated on the R SIJ/R low back today  Spasm slightly decreased post txs; however, patient continued to have difficulty with some exs due to pain  Plan: Continue per plan of care        Precautions:  HTN, DM       Manuals 7/13 7/15 7/18     Bilateral HS, quad, piriformis, hip ABD, and gastroc with manual hip traction   15 min  15'        5' Posterior correction of the R innominate                     Neuro Re-Ed        Seated Tball 3 way trunk flexion    10x5" ea     Stand trunk extension against bar        Stand OAL        Supine Tball ABD crunch   10x3" Hold  10x5"     PPT  10x5" Hold  10x5"     PPT with hip march   10x Bilat               Ther Ex        Treadmill   10 min   1 0 MPH  10' 1 0mph     S/L Clam shells  2x10 Bilat  Red       S/L reverse clam shells         Bridge and with ABD  2x10 and with ABD Red       Side step and squat        Monster walk         LTR   10x5"     HEP update/review  15 min        Ther Activity                        Gait Training                        Modalities        MHP  PRN  Declined  15'MH in prone

## 2022-07-18 ENCOUNTER — OFFICE VISIT (OUTPATIENT)
Dept: PHYSICAL THERAPY | Facility: CLINIC | Age: 27
End: 2022-07-18
Payer: COMMERCIAL

## 2022-07-18 ENCOUNTER — OFFICE VISIT (OUTPATIENT)
Dept: PAIN MEDICINE | Facility: CLINIC | Age: 27
End: 2022-07-18
Payer: COMMERCIAL

## 2022-07-18 VITALS
SYSTOLIC BLOOD PRESSURE: 130 MMHG | DIASTOLIC BLOOD PRESSURE: 88 MMHG | WEIGHT: 293 LBS | HEIGHT: 67 IN | RESPIRATION RATE: 20 BRPM | TEMPERATURE: 97.8 F | BODY MASS INDEX: 45.99 KG/M2 | HEART RATE: 79 BPM

## 2022-07-18 DIAGNOSIS — K21.9 GASTROESOPHAGEAL REFLUX DISEASE, UNSPECIFIED WHETHER ESOPHAGITIS PRESENT: ICD-10-CM

## 2022-07-18 DIAGNOSIS — G89.29 CHRONIC RIGHT SACROILIAC JOINT PAIN: Primary | ICD-10-CM

## 2022-07-18 DIAGNOSIS — M54.16 LUMBAR RADICULOPATHY: ICD-10-CM

## 2022-07-18 DIAGNOSIS — M53.3 CHRONIC RIGHT SACROILIAC JOINT PAIN: ICD-10-CM

## 2022-07-18 DIAGNOSIS — M53.3 CHRONIC RIGHT SACROILIAC JOINT PAIN: Primary | ICD-10-CM

## 2022-07-18 DIAGNOSIS — G89.29 CHRONIC RIGHT SACROILIAC JOINT PAIN: ICD-10-CM

## 2022-07-18 PROCEDURE — 97140 MANUAL THERAPY 1/> REGIONS: CPT

## 2022-07-18 PROCEDURE — 97112 NEUROMUSCULAR REEDUCATION: CPT

## 2022-07-18 PROCEDURE — 99214 OFFICE O/P EST MOD 30 MIN: CPT | Performed by: ANESTHESIOLOGY

## 2022-07-18 PROCEDURE — 97110 THERAPEUTIC EXERCISES: CPT

## 2022-07-18 RX ORDER — MELOXICAM 7.5 MG/1
7.5 TABLET ORAL 2 TIMES DAILY PRN
Qty: 60 TABLET | Refills: 0 | Status: SHIPPED | OUTPATIENT
Start: 2022-07-18

## 2022-07-18 RX ORDER — OMEPRAZOLE 20 MG/1
20 CAPSULE, DELAYED RELEASE ORAL DAILY
Qty: 30 CAPSULE | Refills: 0 | Status: SHIPPED | OUTPATIENT
Start: 2022-07-18

## 2022-07-18 NOTE — PROGRESS NOTES
Assessment  1  Chronic right sacroiliac joint pain - Right  -     meloxicam (MOBIC) 7 5 mg tablet; Take 1 tablet (7 5 mg total) by mouth 2 (two) times a day as needed for moderate pain    2  Chronic right sacroiliac joint pain  -     meloxicam (MOBIC) 7 5 mg tablet; Take 1 tablet (7 5 mg total) by mouth 2 (two) times a day as needed for moderate pain    3  Gastroesophageal reflux disease, unspecified whether esophagitis present  -     omeprazole (PriLOSEC) 20 mg delayed release capsule; Take 1 capsule (20 mg total) by mouth daily    4  Lumbar radiculopathy    Greater than 70% relief of pain with improved ability to participate with IADLs after right SIJ injection; continues to complain of bilateral L4 radicular pain, right greater than leftaccompanied by pain limited weakness numbness and paresthesias; no significant lumbar degenerative change noted on xray  Previously reported the following symptomatology:     Axial right low back pain described primarily by arthritic features  Aching, nagging, indolent, stabbing, throbbing features in axial low back without radicular components  5/5 strength bilaterally, negative SLR  Positive right sided SIJ loading maneuvers +Gaenslen's + TERRIE's test, compression maneuvers, positive tenderness to palpation over lumbar paraspinal muscles  Currently she is neurologically intact without gait instability, saddle anesthesia or bowel/bladder abnormality  Risks, benefits alternative to SIJ injection thoroughly discussed with patient  Lifestyle modifications extensively discussed including diet, exercise and weight loss and control of DM-2  Handouts provided questions answered to patient satisfaction  Plan  -f/u after PT low back pain, right sij dysfunction to consider mri lumbar spine noncontrast  -Meloxicam 7 5 mg b i d  prn pain prescribed   May taper Patient educated regarding pregnancy and  bleeding risk of taking this medication as well as not taking any other nonsteroidal anti-inflammatory medications while taking this medication; counseled thoroughly regarding potential risk of Cardiovascular injury, Kidney injury, Gastrointestinal ulceration/bleeding  Patient voiced understanding; rx omeprzole 20mg/day for gi ppx  -script provided for formal physical therapy for right-sided SIJ dysfunction; Physician directed home exercise plan as per [employer] demonstrated and handouts provided that patient plans to participate with for 1 hour, twice a week for the next 6 weeks  There are risks associated with opioid medications, including dependence, addiction and tolerance  The patient understands and agrees to use these medications only as prescribed  Potential side effects of the medications include, but are not limited to, constipation, drowsiness, addiction, impaired judgment and risk of fatal overdose if not taken as prescribed  The patient was warned against driving while taking sedation medications or operating heavy machinery  The patient voiced understanding  Sharing medications is a felony  At this point in time, the patient is showing no signs of addiction, abuse, diversion or suicidal ideation  [place] Prescription Drug Monitoring Program report was reviewed and was appropriate      Complete risks and benefits including bleeding, infection, tissue reaction, nerve injury and allergic reaction were discussed  The approach was demonstrated using models and literature was provided  Verbal and written consent was obtained  My impressions and treatment recommendations were discussed in detail with the patient who verbalized understanding and had no further questions  Discharge instructions were provided  I personally saw and examined the patient and I agree with the above discussed plan of care  My impressions and treatment recommendations were discussed in detail with the patient who verbalized understanding and had no further questions    Discharge instructions were provided  I personally saw and examined the patient and I agree with the above discussed plan of care  New Medications Ordered This Visit   Medications    meloxicam (MOBIC) 7 5 mg tablet     Sig: Take 1 tablet (7 5 mg total) by mouth 2 (two) times a day as needed for moderate pain     Dispense:  60 tablet     Refill:  0    omeprazole (PriLOSEC) 20 mg delayed release capsule     Sig: Take 1 capsule (20 mg total) by mouth daily     Dispense:  30 capsule     Refill:  0       History of Present Illness    Greater than 70% relief of pain with improved ability to participate with IADLs after right SIJ injection; continues to complain of bilateral L4 radicular pain, right greater than left accompanied by pain limited weakness numbness and paresthesias; Previously reported the following symptomatology:     Marjan Warner is a 32 y o  female with pmhx of obesity, DM-2, XOL, HTN presenting with right sided low back pain with arthritic features  She describes 8/10 low back pain that is worse in the mornings and worse at the end of the day  The pain is characterized by achy, nagging, indolent, crampy, stabbing pain in her axial right low back  The patient describes that the pain is worse with standing for long periods of time on hard surfaces as well as with walking  The patient is a very active individual and feels as though this pain compromises her participation with independent activities of daily living  The pain can be debilitating at times and contribute to significant disability, compromising overall activity and independent activities of daily living  She has tried physical therapy with limited relief of symptoms  Medications the patient has tried in the past include flexeril, ibuprofen  She describes no radicular symptoms and has good strength  She denies any weakness numbness or paresthesias  The patient denies any bowel or bladder dysfunction as well        I have personally reviewed and/or updated the patient's past medical history, past surgical history, family history, social history, current medications, allergies, and vital signs today  Review of Systems   Constitutional: Positive for activity change  HENT: Negative  Eyes: Negative  Respiratory: Negative  Cardiovascular: Negative  Gastrointestinal: Negative  Endocrine: Negative  Genitourinary: Negative  Musculoskeletal: Positive for arthralgias, back pain, gait problem and myalgias  Skin: Negative  Allergic/Immunologic: Negative  Neurological: Negative for weakness and numbness  Hematological: Negative  Psychiatric/Behavioral: Negative  All other systems reviewed and are negative  Patient Active Problem List   Diagnosis    Low back pain radiating to right lower extremity - Right    Lumbar radiculopathy    Chronic right sacroiliac joint pain       Past Medical History:   Diagnosis Date    Asthma        Past Surgical History:   Procedure Laterality Date    HIP SURGERY     Ann James JOINT Right 7/5/2022    Procedure: BLOCK / INJECTION SACROILIAC;  Surgeon: Travon Abreu MD;  Location: [facility];  Service: Pain Management        History reviewed  No pertinent family history      Social History     Occupational History    Not on file   Tobacco Use    Smoking status: Never Smoker    Smokeless tobacco: Never Used   Vaping Use    Vaping Use: Never used   Substance and Sexual Activity    Alcohol use: Never    Drug use: Never    Sexual activity: Not on file       Current Outpatient Medications on File Prior to Visit   Medication Sig    albuterol (Proventil HFA) 90 mcg/act inhaler Inhale 2 puffs every 6 (six) hours as needed for wheezing    atorvastatin (LIPITOR) 40 mg tablet Take 40 mg by mouth daily    hydrochlorothiazide (HYDRODIURIL) 12 5 mg tablet Take 12 5 mg by mouth daily    insulin glargine (LANTUS SOLOSTAR) 100 units/mL injection pen Inject 12 Units under the skin daily    losartan (COZAAR) 50 mg tablet Take 50 mg by mouth daily    metFORMIN (GLUCOPHAGE) 1000 MG tablet Take 1 tablet by mouth 2 (two) times a day with meals    [DISCONTINUED] lidocaine (Lidoderm) 5 % Apply 1 patch topically daily Remove & Discard patch within 12 hours or as directed by MD    [DISCONTINUED] meloxicam (MOBIC) 7 5 mg tablet Take 1 tablet (7 5 mg total) by mouth 2 (two) times a day as needed for moderate pain     No current facility-administered medications on file prior to visit  No Known Allergies      Physical Exam    /88   Pulse 79   Temp 97 8 °F (36 6 °C)   Resp 20   Ht 5' 7" (1 702 m)   Wt 134 kg (295 lb 12 8 oz)   BMI 46 33 kg/m²     Constitutional: normal, well developed, well nourished, alert, in no distress and non-toxic and no overt pain behavior  and obese  Eyes: anicteric  HEENT: grossly intact  Neck: supple, symmetric, trachea midline and no masses   Pulmonary:even and unlabored  Cardiovascular:No edema or pitting edema present  Skin:Normal without rashes or lesions and well hydrated  Psychiatric:Mood and affect appropriate  Neurologic:Cranial Nerves II-XII grossly intact Sensation grossly intact; no clonus negative borges's  Reflexes 2+ and brisk  SLR negative bilaterally  Spurling's maneuver negative bilaterally  Musculoskeletal:normal gait  5/5 strength bilaterally with AROM in all extremities  Normal heel toe and tip toe walking  Significant pain with lumbar facet loading bilaterally and with lateral spine rotation, right greater than left  TTP over lumbar paraspinal muscles   Positive right sided SIJ loading maneuvers +Gaenslen's + TERRIE's test, compression maneuvers    Imaging    LUMBAR SPINE     INDICATION:   M54 50: Low back pain, unspecified  M79 604: Pain in right leg      COMPARISON:  None     VIEWS:  XR SPINE LUMBAR 2 OR 3 VIEWS INJURY        FINDINGS:     There are 5 non rib bearing lumbar vertebral bodies       There is no evidence of acute fracture or destructive osseous lesion      Alignment is unremarkable       No significant lumbar degenerative change noted      The pedicles appear intact      Soft tissues are unremarkable      IMPRESSION:     Normal examination         Workstation performed: [id number]

## 2022-07-18 NOTE — PATIENT INSTRUCTIONS
Core Strengthening Exercises   WHAT YOU NEED TO KNOW:   Your core includes the muscles of your lower back, hip, pelvis, and abdomen  Core strengthening exercises help heal and strengthen these muscles  This helps prevent another injury, and keeps your pelvis, spine, and hips in the correct position  DISCHARGE INSTRUCTIONS:   Contact your healthcare provider if:   You have sharp or worsening pain during exercise or at rest     You have questions or concerns about your shoulder exercises  Safety tips:  Talk to your healthcare provider before you start an exercise program  A physical therapist can teach you how to do core strengthening exercises safely  Do the exercises on a mat or firm surface  A firm surface will support your spine and prevent low back pain  Do not do these exercises on a bed  Move slowly and smoothly  Avoid fast or jerky motions  Stop if you feel pain  Core exercises should not be painful  Stop if you feel pain  Breathe normally during core exercises  Do not hold your breath  This may cause an increase in blood pressure and prevent muscle strengthening  Your healthcare provider will tell you when to inhale and exhale during the exercise  Begin all of your exercises with abdominal bracing  Abdominal bracing helps warm up your core muscles  You can also practice abdominal bracing throughout the day  Lie on your back with your knees bent and feet flat on the floor  Place your arms in a relaxed position beside your body  Tighten your abdominal muscles  Pull your belly button in and up toward your spine  Hold for 5 seconds  Relax your muscles  Repeat 10 times  Core strengthening exercises: Your healthcare provider will tell you how often to do these exercises  The provider will also tell you how many repetitions of each exercise you should do  Hold each exercise for 5 seconds or as directed  As you get stronger, increase your hold to 10 to 15 seconds   You can do some of these exercises on a stability ball, or with a weight  Ask your healthcare provider how to use a stability ball or weight for these exercises:  Bridging:  Lie on your back with your knees bent and feet flat on the floor  Rest your arms at your side  Tighten your buttocks, and then lift your hips 1 inch off the floor  Hold for 5 seconds  When you can do this exercise without pain for 10 seconds, increase the distance you lift your hips  A good goal is to be able to lift your hips so that your shoulders, hips, and knees are in a straight line  Dead bug:  Lie on your back with your knees bent and feet flat on the floor  Place your arms in a relaxed position beside your body  Begin with abdominal bracing  Next, raise one leg, keeping your knee bent  Hold for 5 seconds  Repeat with the other leg  When you can do this exercise without pain for 10 to 15 seconds, you may raise one straight leg and hold  Repeat with the other leg  Quadruped:  Place your hands and knees on the floor  Keep your wrists directly below your shoulders and your knees directly below your hips  Pull your belly button in toward your spine  Do not flatten or arch your back  Tighten your abdominal muscles below your belly button  Hold for 5 seconds  When you can do this exercise without pain for 10 to 15 seconds, you may extend one arm and hold  Repeat on the other side  Side bridge exercises:      Standing side bridge:  Stand next to a wall and extend one arm toward the wall  Place your palm flat on the wall with your fingers pointing upward  Begin with abdominal bracing  Next, without moving your feet, slowly bend your arm to 90 degrees  Hold for 5 seconds  Repeat on the other side  When you can do this exercise without pain for 10 to 15 seconds, you may do the bent leg side bridge on the floor  Bent leg side bridge:  Lie on one side with your legs, hips, and shoulders in a straight line   Prop yourself up onto your forearm so your elbow is directly below your shoulder  Bend your knees back to 90 degrees  Begin with abdominal bracing  Next, lift your hips and balance yourself on your forearm and knees  Hold for 5 seconds  Repeat on the other side  When you can do this exercise without pain for 10 to 15 seconds, you may do the straight leg side bridge on the floor  Straight leg side bridge:  Lie on one side with your legs, hips, and shoulders in a straight line  Prop yourself up onto your forearm so your elbow is directly below your shoulder  Begin with abdominal bracing  Lift your hips off the floor and balance yourself on your forearm and the outside of your flexed foot  Do not let your ankle bend sideways  Hold for 5 seconds  Repeat on the other side  When you can do this exercise without pain for 10 to 15 seconds, ask your healthcare provider for more advanced exercises  Superman:  Lie on your stomach  Extend your arms forward on the floor  Tighten your abdominal muscles and lift your right hand and left leg off the floor  Hold this position  Slowly return to the starting position  Tighten your abdominal muscles and lift your left hand and right leg off the floor  Hold this position  Slowly return to the starting position  Clam:  Lie on your side with your knees bent  Put your bottom arm under your head to keep your neck in line  Put your top hand on your hip to keep your pelvis from moving  Put your heels together, and keep them together during this exercise  Slowly raise your top knee toward the ceiling  Then lower your leg so your knees are together  Repeat this exercise 10 times  Then switch sides and do the exercise 10 times with the other leg  Curl up:  Lie on your back with your knees bent and feet flat on the floor  Place your hands, palms down, underneath your lower back  Next, with your elbows on the floor, lift your shoulders and chest 2 to 3 inches off the floor   Keep your head in line with your shoulders  Hold this position  Slowly return to the starting position  Straight leg raises:  Lie on your back with one leg straight  Bend the other knee and place your foot flat on the floor  Tighten your abdominal muscles  Keep your leg straight and slowly lift it straight up 6 to 12 inches off the floor  Hold this position  Lower your leg slowly  Do as many repetitions as directed on this side  Repeat with the other leg  Plank:  Lie on your stomach  Bend your elbows and place your forearms flat on the floor  Lift your chest, stomach, and knees off the floor  Make sure your elbows are below your shoulders  Your body should be in a straight line  Do not let your hips or lower back sink to the ground  Squeeze your abdominal muscles together and hold for 15 seconds  To make this exercise harder, hold for 30 seconds or lift 1 leg at a time  Bicycles:  Lie on your back  Bend both knees and bring them toward your chest  Your calves should be parallel to the floor  Place the palms of your hands on the back of your head  Straighten your right leg and keep it lifted 2 inches off the floor  Raise your head and shoulders off the floor and twist towards your left  Keep your head and shoulders lifted  Bend your right knee while you straighten your left leg  Keep your left leg 2 inches off the floor  Twist your head and chest towards the left leg  Continue to straighten 1 leg at a time and twist        Follow up with your doctor as directed:  Write down your questions so you remember to ask them during your visits  © Copyright [employer] 2022 Information is for End User's use only and may not be sold, redistributed or otherwise used for commercial purposes  All illustrations and images included in CareNotes® are the copyrighted property of [employer]  or [facility] [employer]   The above information is an  only   It is not intended as medical advice for individual conditions or treatments  Talk to your doctor, nurse or pharmacist before following any medical regimen to see if it is safe and effective for you

## 2022-07-19 NOTE — PROGRESS NOTES
Daily Note     Today's date: 2022  Patient name: Abena Dumont  : 1995  MRN: [id number]  Referring provider: Eli Conti MD  Dx:   Encounter Diagnosis     ICD-10-CM    1  Chronic right sacroiliac joint pain  M53 3     G89 29    2  Lumbar radiculopathy  M54 16                   Subjective: "I felt real good yesterday, but I woke up sore today "  Patient reports feeling better post there ex and manual txs  Objective: See treatment diary below      Assessment: Tolerated treatment well  Patient exhibited good technique with therapeutic exercises and would benefit from continued PT to increase lumbar ROM/strength and endurance to improve mobility  Plan: Continue per plan of care        Precautions:  HTN, DM       Manuals 7/13 7/15 7/18 7/20    Bilateral HS, quad, piriformis, hip ABD, and gastroc with manual hip traction   15 min  15' 10'       5' Posterior correction of the R innominate 5'                     Neuro Re-Ed       Seated Tball 3 way trunk flexion    10x5" ea NV    Stand trunk extension against bar        Stand OAL        Supine Tball ABD crunch   10x3" Hold  10x5" 10x5"    PPT  10x5" Hold  10x5" 2x10 3"hold    PPT with hip march   10x Bilat   NV            Ther Ex       Treadmill   10 min   1 0 MPH  10' 1 0mph 10' 1 0mph    S/L Clam shells  2x10 Bilat  Red   2x10   no band    S/L reverse clam shells     2x10   no band    Bridge and with ABD  2x10 and with ABD Red   2x10   no ABD    Side step and squat        Monster walk         LTR   10x5" 10x5" bilat    HEP update/review  15 min        Ther Activity                       Gait Training                       Modalities       MHP  PRN  Declined  15'MH in prone 15' MH in prone

## 2022-07-20 ENCOUNTER — OFFICE VISIT (OUTPATIENT)
Dept: PHYSICAL THERAPY | Facility: CLINIC | Age: 27
End: 2022-07-20
Payer: COMMERCIAL

## 2022-07-20 DIAGNOSIS — G89.29 CHRONIC RIGHT SACROILIAC JOINT PAIN: Primary | ICD-10-CM

## 2022-07-20 DIAGNOSIS — M53.3 CHRONIC RIGHT SACROILIAC JOINT PAIN: Primary | ICD-10-CM

## 2022-07-20 DIAGNOSIS — M54.16 LUMBAR RADICULOPATHY: ICD-10-CM

## 2022-07-20 PROCEDURE — 97140 MANUAL THERAPY 1/> REGIONS: CPT

## 2022-07-20 PROCEDURE — 97112 NEUROMUSCULAR REEDUCATION: CPT

## 2022-07-20 PROCEDURE — 97110 THERAPEUTIC EXERCISES: CPT

## 2022-07-21 NOTE — PROGRESS NOTES
Daily Note     Today's date: 2022  Patient name: Svitlana Hamilton  : 1995  MRN: [id number]  Referring provider: Dallas Gomes MD  Dx:   Encounter Diagnosis     ICD-10-CM    1  Chronic right sacroiliac joint pain  M53 3     G89 29    2  Lumbar radiculopathy  M54 16                   Subjective:  Patient reports only minimal low back pain today  Objective: See treatment diary below      Assessment: Tolerated treatment well  Patient exhibited good technique with therapeutic exercises and would benefit from continued PT to increase lumbar ROM/strength and endurance to improve mobility  Plan: Continue per plan of care        Precautions:  HTN, DM       Manuals 7/13 7/15 7/18 7/20 7/25   Bilateral HS, quad, piriformis, hip ABD, and gastroc with manual hip traction   15 min  15' 10' 10'      5' Posterior correction of the R innominate 5'  checked                   Neuro Re-Ed      Seated Tball 3 way trunk flexion    10x5" ea NV 10x5" ea   Stand trunk extension against bar        Stand OAL        Supine Tball ABD crunch   10x3" Hold  10x5" 10x5" 15x5"   PPT  10x5" Hold  10x5" 2x10 3"hold 2x10 3"hold   PPT with hip march   10x Bilat   NV 10x bilat           Ther Ex      Treadmill   10 min   1 0 MPH  10' 1 0mph 10' 1 0mph 10'   S/L Clam shells  2x10 Bilat  Red   2x10   no band 2x10   no band   S/L reverse clam shells     2x10   no band 2x10  No band   Bridge and with ABD  2x10 and with ABD Red   2x10   no ABD 2x10   no ABD   Side step and squat        Monster walk         LTR   10x5" 10x5" bilat 10x5" bilat   HEP update/review  15 min        Ther Activity                      Gait Training                      Modalities      MHP  PRN  Declined  15'MH in prone 15' MH in prone declined

## 2022-07-25 ENCOUNTER — OFFICE VISIT (OUTPATIENT)
Dept: PHYSICAL THERAPY | Facility: CLINIC | Age: 27
End: 2022-07-25
Payer: COMMERCIAL

## 2022-07-25 DIAGNOSIS — M53.3 CHRONIC RIGHT SACROILIAC JOINT PAIN: Primary | ICD-10-CM

## 2022-07-25 DIAGNOSIS — G89.29 CHRONIC RIGHT SACROILIAC JOINT PAIN: Primary | ICD-10-CM

## 2022-07-25 DIAGNOSIS — M54.16 LUMBAR RADICULOPATHY: ICD-10-CM

## 2022-07-25 PROCEDURE — 97140 MANUAL THERAPY 1/> REGIONS: CPT

## 2022-07-25 PROCEDURE — 97110 THERAPEUTIC EXERCISES: CPT

## 2022-07-25 PROCEDURE — 97112 NEUROMUSCULAR REEDUCATION: CPT

## 2022-07-27 ENCOUNTER — APPOINTMENT (OUTPATIENT)
Dept: PHYSICAL THERAPY | Facility: CLINIC | Age: 27
End: 2022-07-27
Payer: COMMERCIAL

## 2022-07-27 NOTE — PROGRESS NOTES
Daily Note     Today's date: 2022  Patient name: Mikhail Penn  : 1995  MRN: [id number]  Referring provider: Nikki Garcia MD  Dx:   Encounter Diagnosis     ICD-10-CM    1  Chronic right sacroiliac joint pain  M53 3     G89 29    2  Lumbar radiculopathy  M54 16                   Subjective:  Patient reports having a good couple of days with overall decrease pain levels and tightness to 1/10 level  Objective: See treatment diary below      Assessment: Tolerated treatment well  PT notes continuation of decrease ROM and strength t/o the lumbar spine with trunk/core weakness with need for continuation of skilled therapy  Plan: Continue per plan of care        Precautions:  HTN, DM       Manuals 7/15 7/18 7/20 7/25 7/28   Bilateral HS, quad, piriformis, hip ABD, and gastroc with manual hip traction  15 min  15' 10' 10' 15 min      5' Posterior correction of the R innominate 5'  checked                    Neuro Re-Ed      Seated Tball 3 way trunk flexion   10x5" ea NV 10x5" ea 10x5" Hold    Stand trunk extension against bar        Stand OAL        Supine Tball ABD crunch  10x3" Hold  10x5" 10x5" 15x5" 15x5" Hold    PPT 10x5" Hold  10x5" 2x10 3"hold 2x10 3"hold DC   PPT with hip march  10x Bilat   NV 10x bilat 10x Bilat            Ther Ex      Treadmill  10 min   1 0 MPH  10' 1 0mph 10' 1 0mph 10' 10 min   1 3 MPH    S/L Clam shells 2x10 Bilat  Red   2x10   no band 2x10   no band 2x10   Red    S/L reverse clam shells    2x10   no band 2x10  No band 2x10   No Band    Bridge and with ABD 2x10 and with ABD Red   2x10   no ABD 2x10   no ABD 2x10 Bridge Only    Side step and squat        Monster walk         LTR  10x5" 10x5" bilat 10x5" bilat 10x5" Hold Bilat    HEP update/review         Ther Activity                      Gait Training                      Modalities      MHP  Declined  15'MH in prone 15' MH in prone declined Declined

## 2022-07-28 ENCOUNTER — OFFICE VISIT (OUTPATIENT)
Dept: PHYSICAL THERAPY | Facility: CLINIC | Age: 27
End: 2022-07-28
Payer: COMMERCIAL

## 2022-07-28 DIAGNOSIS — G89.29 CHRONIC RIGHT SACROILIAC JOINT PAIN: Primary | ICD-10-CM

## 2022-07-28 DIAGNOSIS — M54.16 LUMBAR RADICULOPATHY: ICD-10-CM

## 2022-07-28 DIAGNOSIS — M53.3 CHRONIC RIGHT SACROILIAC JOINT PAIN: Primary | ICD-10-CM

## 2022-07-28 PROCEDURE — 97112 NEUROMUSCULAR REEDUCATION: CPT | Performed by: PHYSICAL THERAPIST

## 2022-07-28 PROCEDURE — 97140 MANUAL THERAPY 1/> REGIONS: CPT | Performed by: PHYSICAL THERAPIST

## 2022-07-28 PROCEDURE — 97110 THERAPEUTIC EXERCISES: CPT | Performed by: PHYSICAL THERAPIST

## 2022-07-28 NOTE — PROGRESS NOTES
Daily Note     Today's date: 2022  Patient name: Syl Brown  : 1995  MRN: [id number]  Referring provider: Pau Salazar MD  Dx: No diagnosis found  Subjective: ***      Objective: See treatment diary below      Assessment: Tolerated treatment {Tolerated treatment :0961698151}  Patient exhibited good technique with therapeutic exercises and would benefit from continued PT to increase lumbar ROM/strength and endurance to improve mobility  Plan: Continue per plan of care        Precautions:  HTN, DM       Manuals  8   Bilateral HS, quad, piriformis, hip ABD, and gastroc with manual hip traction  15' 10' 10' 15 min      5' Posterior correction of the R innominate 5'  checked                     Neuro Re-Ed    Seated Tball 3 way trunk flexion  10x5" ea NV 10x5" ea 10x5" Hold     Stand trunk extension against bar        Stand OAL        Supine Tball ABD crunch  10x5" 10x5" 15x5" 15x5" Hold     PPT 10x5" 2x10 3"hold 2x10 3"hold DC    PPT with hip march   NV 10x bilat 10x Bilat             Ther Ex   81   Treadmill  10' 1 0mph 10' 1 0mph 10' 10 min   1 3 MPH     S/L Clam shells  2x10   no band 2x10   no band 2x10   Red     S/L reverse clam shells   2x10   no band 2x10  No band 2x10   No Band     Bridge and with ABD  2x10   no ABD 2x10   no ABD 2x10 Bridge Only     Side step and squat        Monster walk         LTR 10x5" 10x5" bilat 10x5" bilat 10x5" Hold Bilat     HEP update/review         Ther Activity   8                   Gait Training   8                   Modalities    MHP  15'MH in prone 15' MH in prone declined Declined

## 2022-08-01 ENCOUNTER — APPOINTMENT (OUTPATIENT)
Dept: PHYSICAL THERAPY | Facility: CLINIC | Age: 27
End: 2022-08-01
Payer: COMMERCIAL

## 2022-08-03 ENCOUNTER — OFFICE VISIT (OUTPATIENT)
Dept: PHYSICAL THERAPY | Facility: CLINIC | Age: 27
End: 2022-08-03
Payer: COMMERCIAL

## 2022-08-03 DIAGNOSIS — M53.3 CHRONIC RIGHT SACROILIAC JOINT PAIN: Primary | ICD-10-CM

## 2022-08-03 DIAGNOSIS — G89.29 CHRONIC RIGHT SACROILIAC JOINT PAIN: Primary | ICD-10-CM

## 2022-08-03 DIAGNOSIS — M54.16 LUMBAR RADICULOPATHY: ICD-10-CM

## 2022-08-03 PROCEDURE — 97140 MANUAL THERAPY 1/> REGIONS: CPT

## 2022-08-03 PROCEDURE — 97112 NEUROMUSCULAR REEDUCATION: CPT

## 2022-08-03 PROCEDURE — 97110 THERAPEUTIC EXERCISES: CPT

## 2022-08-03 NOTE — PROGRESS NOTES
Daily Note     Today's date: 8/3/2022  Patient name: Luis Dixon  : 1995  MRN: [id number]  Referring provider: Bandar Perales MD  Dx:   Encounter Diagnosis     ICD-10-CM    1  Chronic right sacroiliac joint pain  M53 3     G89 29    2  Lumbar radiculopathy  M54 16                   Subjective: "I just have a little pain on the R side of my low back today, but nothing too bad "      Objective: See treatment diary below      Assessment: Tolerated treatment well  Patient exhibited good technique with therapeutic exercises and would benefit from continued PT to increase lumbar ROM/strength and endurance to improve mobility  Plan: Continue per plan of care  Precautions:  HTN, DM       Manuals  8/3   Bilateral HS, quad, piriformis, hip ABD, and gastroc with manual hip traction  15' 10' 10' 15 min  10'    5' Posterior correction of the R innominate 5'  checked  5'Posterior correction of the R innominate                   Neuro Re-Ed   83   Seated Tball 3 way trunk flexion  10x5" ea NV 10x5" ea 10x5" Hold  10x5"ea   Stand trunk extension against bar        Stand OAL        Supine Tball ABD crunch  10x5" 10x5" 15x5" 15x5" Hold  15x5"   PPT 10x5" 2x10 3"hold 2x10 3"hold DC    PPT with hip march   NV 10x bilat 10x Bilat  10x bilat           Ther Ex   8/3   Treadmill  10' 1 0mph 10' 1 0mph 10' 10 min   1 3 MPH  10' 1  3mph   S/L Clam shells  2x10   no band 2x10   no band 2x10   Red  2x10 RED   S/L reverse clam shells   2x10   no band 2x10  No band 2x10   No Band  2x10   no band   Bridge and with ABD  2x10   no ABD 2x10   no ABD 2x10 Bridge Only  2x10 Bridge only   Side step and squat        Monster walk         LTR 10x5" 10x5" bilat 10x5" bilat 10x5" Hold Bilat  10x5" bilat   HEP update/review         Ther Activity   8/3                   Gait Training   83                   Modalities   83   MHP  15'MH in prone 15' [address] in prone declined Declined  15'in prone

## 2022-08-03 NOTE — PROGRESS NOTES
Daily Note     Today's date: 2022  Patient name: Rhett Nuñez  : 1995  MRN: [id number]  Referring provider: Neptali Black MD  Dx:   Encounter Diagnosis     ICD-10-CM    1  Chronic right sacroiliac joint pain  M53 3     G89 29    2  Lumbar radiculopathy  M54 16                   Subjective: "I think my back is out again "  Patient reports relief of symptoms post txs today  Objective: See treatment diary below      Assessment: Tolerated treatment well  Patient exhibited good technique with therapeutic exercises and would benefit from continued PT to increase lumbar ROM/strength and endurance to improve mobility  Plan: Continue per plan of care  Precautions:  HTN, DM       Manuals 7/20 7/25 7/28 8/3 8/4   Bilateral HS, quad, piriformis, hip ABD, and gastroc with manual hip traction  10' 10' 15 min  10' 10'    5'  checked  5'Posterior correction of the R innominate 5' Posterior correction of the R innominate                   Neuro Re-Ed 7/20 7/25  8/3 8/4   Seated Tball 3 way trunk flexion  NV 10x5" ea 10x5" Hold  10x5"ea 10x5"ea   Stand trunk extension against bar        Stand OAL        Supine Tball ABD crunch  10x5" 15x5" 15x5" Hold  15x5" 15x5"   PPT 2x10 3"hold 2x10 3"hold DC     PPT with hip march  NV 10x bilat 10x Bilat  10x bilat 10x bilat            Ther Ex 7/20 7/25  8/3 8/4   Treadmill  10' 1 0mph 10' 10 min   1 3 MPH  10' 1  3mph NT   S/L Clam shells 2x10   no band 2x10   no band 2x10   Red  2x10 RED 2x10   no band   S/L reverse clam shells  2x10   no band 2x10  No band 2x10   No Band  2x10   no band 2x10  No band   Bridge and with ABD 2x10   no ABD 2x10   no ABD 2x10 Bridge Only  2x10 Bridge only 2x10 bridge only   Side step and squat        Monster walk         LTR 10x5" bilat 10x5" bilat 10x5" Hold Bilat  10x5" bilat 10x5" bilat   HEP update/review         Ther Activity 7/20 7/25  8/3 8/4                   Gait Training 7/20 7/25  8/3 8/4                   Modalities  7/25  8/3 8/4   MHP  15' MH in prone declined Declined  15'in prone declined

## 2022-08-04 ENCOUNTER — OFFICE VISIT (OUTPATIENT)
Dept: PHYSICAL THERAPY | Facility: CLINIC | Age: 27
End: 2022-08-04
Payer: COMMERCIAL

## 2022-08-04 DIAGNOSIS — M53.3 CHRONIC RIGHT SACROILIAC JOINT PAIN: Primary | ICD-10-CM

## 2022-08-04 DIAGNOSIS — M54.16 LUMBAR RADICULOPATHY: ICD-10-CM

## 2022-08-04 DIAGNOSIS — G89.29 CHRONIC RIGHT SACROILIAC JOINT PAIN: Primary | ICD-10-CM

## 2022-08-04 PROCEDURE — 97112 NEUROMUSCULAR REEDUCATION: CPT

## 2022-08-04 PROCEDURE — 97110 THERAPEUTIC EXERCISES: CPT

## 2022-08-04 NOTE — PROGRESS NOTES
Daily Note     Today's date: 2022  Patient name: Svitlana Hamilton  : 1995  MRN: [id number]  Referring provider: Dallas Gomes MD  Dx:   Encounter Diagnosis     ICD-10-CM    1  Chronic right sacroiliac joint pain  M53 3     G89 29    2  Lumbar radiculopathy  M54 16                   Subjective: "I felt my back go out Friday and couldn't wait to get back to therapy today to get it feeling better "      Objective: See treatment diary below      Assessment: Tolerated treatment well  Patient exhibited good technique with therapeutic exercises and would benefit from continued PT to increase lumbar ROM/strength and endurance to improve mobility  Patient demonstrated a good understanding of how to self correct the R innominate if need be when she's not in PT  Plan: Continue per plan of care  Precautions:  HTN, DM       Manuals 7/25 7/28 8/3 8/4 8/8   Bilateral HS, quad, piriformis, hip ABD, and gastroc with manual hip traction  10' 15 min  10' 10' 10'    checked  5'Posterior correction of the R innominate 5' Posterior correction of the R innominate 5' Posterior Correction of the R innominate                   Neuro Re-Ed 7/25  8/3 8/4 8/8   Seated Tball 3 way trunk flexion  10x5" ea 10x5" Hold  10x5"ea 10x5"ea 10x5"ea   Stand trunk extension against bar        Stand OAL        Supine Tball ABD crunch  15x5" 15x5" Hold  15x5" 15x5" 20x5"   PPT 2x10 3"hold DC      PPT with hip march  10x bilat 10x Bilat  10x bilat 10x bilat  NT           Ther Ex 7/25  8/3 8/4 8/8   Treadmill  10' 10 min   1 3 MPH  10' 1  3mph NT 10' 1  3mph   S/L Clam shells 2x10   no band 2x10   Red  2x10 RED 2x10   no band 2x10 Red   S/L reverse clam shells  2x10  No band 2x10   No Band  2x10   no band 2x10  No band 2x10   no band   Bridge and with ABD 2x10   no ABD 2x10 Bridge Only  2x10 Bridge only 2x10 bridge only 2x10 Red   Side step and squat        Monster walk         LTR 10x5" bilat 10x5" Hold Bilat  10x5" bilat 10x5" bilat 10x5"bilat   HEP update/review         Ther Activity 7/25  8/3 8/4 8/8                   Gait Training 7/25  8/3 8/4 8/8                   Modalities 7/25  8/3 8/4 8/8   MHP  declined Declined  15'in prone declined declined

## 2022-08-08 ENCOUNTER — OFFICE VISIT (OUTPATIENT)
Dept: PHYSICAL THERAPY | Facility: CLINIC | Age: 27
End: 2022-08-08
Payer: COMMERCIAL

## 2022-08-08 DIAGNOSIS — M53.3 CHRONIC RIGHT SACROILIAC JOINT PAIN: Primary | ICD-10-CM

## 2022-08-08 DIAGNOSIS — M54.16 LUMBAR RADICULOPATHY: ICD-10-CM

## 2022-08-08 DIAGNOSIS — G89.29 CHRONIC RIGHT SACROILIAC JOINT PAIN: Primary | ICD-10-CM

## 2022-08-08 PROCEDURE — 97110 THERAPEUTIC EXERCISES: CPT

## 2022-08-08 PROCEDURE — 97112 NEUROMUSCULAR REEDUCATION: CPT

## 2022-08-08 PROCEDURE — 97140 MANUAL THERAPY 1/> REGIONS: CPT

## 2022-08-10 NOTE — PROGRESS NOTES
Daily Note     Today's date: 8/10/2022  Patient name: Syl Brown  : 1995  MRN: [id number]  Referring provider: Pau Salazar MD  Dx:   Encounter Diagnosis     ICD-10-CM    1  Chronic right sacroiliac joint pain  M53 3     G89 29    2  Lumbar radiculopathy  M54 16                   Subjective: ***      Objective: See treatment diary below      Assessment: Tolerated treatment {Tolerated treatment :2627085010}  Patient exhibited good technique with therapeutic exercises and would benefit from continued PT to increase ROM/strength and endurance to improve mobility  Plan: Continue per plan of care  Precautions:  HTN, DM       Manuals 7/28 8/3 8/4 8/8 8/11   Bilateral HS, quad, piriformis, hip ABD, and gastroc with manual hip traction  15 min  10' 10' 10'      5'Posterior correction of the R innominate 5' Posterior correction of the R innominate 5' Posterior Correction of the R innominate                    Neuro Re-Ed  8/3 8/4 8/8 8/11   Seated Tball 3 way trunk flexion  10x5" Hold  10x5"ea 10x5"ea 10x5"ea    Stand trunk extension against bar        Stand OAL        Supine Tball ABD crunch  15x5" Hold  15x5" 15x5" 20x5"    PPT DC       PPT with hip march  10x Bilat  10x bilat 10x bilat  NT            Ther Ex  8/3 8/4 8/8 8/11   Treadmill  10 min   1 3 MPH  10' 1  3mph NT 10' 1  3mph    S/L Clam shells 2x10   Red  2x10 RED 2x10   no band 2x10 Red    S/L reverse clam shells  2x10   No Band  2x10   no band 2x10  No band 2x10   no band    Bridge and with ABD 2x10 Bridge Only  2x10 Bridge only 2x10 bridge only 2x10 Red    Side step and squat        Monster walk         LTR 10x5" Hold Bilat  10x5" bilat 10x5" bilat 10x5"bilat    HEP update/review         Ther Activity  8/3 8/4 8/8 8/11                   Gait Training  8/3 8/4 8/8 8/11                   Modalities  8/3 8/4 8/8 8/11   MHP  Declined  15'in prone declined declined

## 2022-08-10 NOTE — PROGRESS NOTES
PT Re-Evaluation     Today's date: 2022  Patient name: Dayana Santana  : 1995  MRN: [id number]  Referring provider: Susi Encinas MD  Dx:   Encounter Diagnosis     ICD-10-CM    1  Chronic right sacroiliac joint pain  M53 3     G89 29    2  Lumbar radiculopathy  M54 16                   Assessment  Assessment details: PT notes the patient with decrease ROM and strength t/o the lumbar spine as well as the bilateral hip and LE with trunk/core weakness leading to increase pain levels and functional limitations with need for course of skilled therapy for 6 weeks with focus on lumbar stabilization, manual therapy, posture, analgesic modalities, and HEP  Impairments: abnormal or restricted ROM, activity intolerance, impaired physical strength, lacks appropriate home exercise program and pain with function  Understanding of Dx/Px/POC: good   Prognosis: good    Goals  ST  Initiate HEP  2  Decrease pain levels by 25-50%   3  Increase ROM by 25-50%   4  Increase strength by 1-2 mm grades  LT  Improve spinal stability with increase trunk/core strength   2  Decrease limitations with standing, walking, and stairs  3  Decrease limitations with trunk movement, lifting, and carrying  4  Decrease limitations with ADL  5   DC with HEP    Plan  Plan details: PT notes review of POC and findings with the patient who is in agreement with PT recommendations of skilled therapy     Patient would benefit from: PT eval and skilled physical therapy  Planned modality interventions: thermotherapy: hydrocollator packs  Planned therapy interventions: manual therapy, neuromuscular re-education, patient education, self care, strengthening, stretching, therapeutic exercise, flexibility, graded exercise and home exercise program  Frequency: 2x week  Duration in weeks: 6  Treatment plan discussed with: patient        Subjective Evaluation    History of Present Illness  Mechanism of injury: Patient reports development of LBP about 3 months ago from insidious onset with progressive worsening over time leading to limitations with sitting, standing, walking, stairs, transfers, lifting, carrying, and ADL  Patient reports she is employed [employer] as   Patient reports she went to pain management MD for evaluation and received a series of injection in the SI joints with 75% improvement since the injection  Patient was referred to OPPT to address Lumbar issues  Patient states significant PMH of right femur fracture repair, HTN, and DM  Presently the patient has attended 10 sessions of skilled therapy and feels   Pain  Current pain ratin  At best pain ratin  At worst pain ratin  Location: Lumbar spine   Quality: dull ache and discomfort  Relieving factors: medications and change in position  Aggravating factors: lifting, stair climbing, walking and standing      Diagnostic Tests  X-ray: normal  Treatments  Current treatment: injection treatment, medication and physical therapy  Patient Goals  Patient goals for therapy: decreased pain, increased motion, increased strength, independence with ADLs/IADLs and return to sport/leisure activities          Objective     Postural Observations  Seated posture: fair  Standing posture: fair        Palpation   Left   Muscle spasm in the erector spinae and lumbar paraspinals  Tenderness of the erector spinae and lumbar paraspinals  Right   Muscle spasm in the erector spinae, lumbar paraspinals and quadratus lumborum  Tenderness of the erector spinae, lumbar paraspinals and quadratus lumborum  Tenderness     Left Hip   No tenderness in the PSIS and sacroiliac joint  Right Hip   Tenderness in the PSIS and sacroiliac joint       Neurological Testing     Reflexes   Left   Patellar (L4): trace (1+)  Achilles (S1): trace (1+)    Right   Patellar (L4): trace (1+)  Achilles (S1): trace (1+)    Active Range of Motion     Lumbar   Flexion: 47 degrees  with pain  Extension: 24 degrees  with pain  Right lateral flexion: 9 degrees  with pain  Left rotation: 8 degrees   Right rotation: 14 degrees   Left Hip   Flexion: 64 degrees   Abduction: WFL  External rotation (90/90): 16 degrees   Internal rotation (90/90): 5 degrees     Right Hip   Flexion: 51 degrees with pain  Abduction: WFL  External rotation (90/90): 14 degrees with pain  Internal rotation (90/90): 0 degrees with pain    Additional Active Range of Motion Details  PT notes decrease ROM and strength t/o the lumbar spine with trunk/core weakness with abdominals of 2+/5 and lumbar paraspinals of 3/5   PT notes decrease ROM and strength t/o the bilateral hip and LE     Passive Range of Motion   Left Hip   Flexion: 74 degrees     Right Hip   Flexion: 67 degrees     Strength/Myotome Testing     Left Hip   Normal muscle strength    Right Hip   Planes of Motion   Flexion: 4  Extension: 4+  Abduction: 4+  Adduction: 5  External rotation: 4-  Internal rotation: 4-    Tests     Right Hip   Negative TERRIE, HESHAMIR and long sit  Abdullahi: Negative  SLR: Positive  Ambulation     Observational Gait   Gait: within functional limits   Walking speed and stride length within functional limits                Precautions:  HTN, DM

## 2022-08-11 ENCOUNTER — APPOINTMENT (OUTPATIENT)
Dept: PHYSICAL THERAPY | Facility: CLINIC | Age: 27
End: 2022-08-11
Payer: COMMERCIAL

## 2022-08-12 NOTE — PROGRESS NOTES
Daily Note     Today's date: 2022  Patient name: Syl Brown  : 1995  MRN: [id number]  Referring provider: Pau Salazar MD  Dx:   Encounter Diagnosis     ICD-10-CM    1  Chronic right sacroiliac joint pain  M53 3     G89 29    2  Lumbar radiculopathy  M54 16                   Subjective: Patient reports that her back is sore on the R side again and feels like her back is out "      Objective: See treatment diary below      Assessment: Tolerated treatment well  Patient exhibited good technique with therapeutic exercises and would benefit from continued PT to increase ROM/strength and endurance to improve mobility  Plan: Continue per plan of care  Precautions:  HTN, DM       Manuals 7/28 8/3 8/4 8/8 8/16   Bilateral HS, quad, piriformis, hip ABD, and gastroc with manual hip traction  15 min  10' 10' 10' 10'     5'Posterior correction of the R innominate 5' Posterior correction of the R innominate 5' Posterior Correction of the R innominate 5' Posterior Correction of the R innominate                   Neuro Re-Ed  8/3 8/4 8/8 8/16   Seated Tball 3 way trunk flexion  10x5" Hold  10x5"ea 10x5"ea 10x5"ea 10x5"ea   Stand trunk extension against bar        Stand OAL        Supine Tball ABD crunch  15x5" Hold  15x5" 15x5" 20x5" 20x5"   PPT DC       PPT with hip march  10x Bilat  10x bilat 10x bilat  NT            Ther Ex  8/3 8/4 8/8 8/16   Treadmill  10 min   1 3 MPH  10' 1  3mph NT 10' 1  3mph 10' 1  3mph   S/L Clam shells 2x10   Red  2x10 RED 2x10   no band 2x10 Red 2x10 bilat RED   S/L reverse clam shells  2x10   No Band  2x10   no band 2x10  No band 2x10   no band [id number] no band   Bridge and with ABD 2x10 Bridge Only  2x10 Bridge only 2x10 bridge only 2x10 Red 2x10 RED   Side step and squat        Monster walk         LTR 10x5" Hold Bilat  10x5" bilat 10x5" bilat 10x5"bilat 10x5"bilat   HEP update/review         Ther Activity  8/3 8/4 8/8 8/16                   Gait Training  8/3 8/4 8/8 8/16                   Modalities  8/3 8/4 8/8 8/16   MHP  Declined  15'in prone declined declined declined

## 2022-08-16 ENCOUNTER — EVALUATION (OUTPATIENT)
Dept: PHYSICAL THERAPY | Facility: CLINIC | Age: 27
End: 2022-08-16
Payer: COMMERCIAL

## 2022-08-16 DIAGNOSIS — M53.3 CHRONIC RIGHT SACROILIAC JOINT PAIN: Primary | ICD-10-CM

## 2022-08-16 DIAGNOSIS — M54.16 LUMBAR RADICULOPATHY: ICD-10-CM

## 2022-08-16 DIAGNOSIS — G89.29 CHRONIC RIGHT SACROILIAC JOINT PAIN: Primary | ICD-10-CM

## 2022-08-16 PROCEDURE — 97140 MANUAL THERAPY 1/> REGIONS: CPT

## 2022-08-16 PROCEDURE — 97110 THERAPEUTIC EXERCISES: CPT

## 2022-08-16 PROCEDURE — 97112 NEUROMUSCULAR REEDUCATION: CPT

## 2022-08-16 NOTE — PROGRESS NOTES
PT Re-Evaluation     Today's date: 2022  Patient name: Leeanne Lazo  : 1995  MRN: [id number]  Referring provider: Christa Anne MD  Dx:   Encounter Diagnosis     ICD-10-CM    1  Chronic right sacroiliac joint pain  M53 3     G89 29    2  Lumbar radiculopathy  M54 16                   Assessment  Assessment details: Pt has attended a total of 10 PT sessions and has made great progress towards goals  She has made progress regards lumbar ROM, hip ROM and strength  Pt reports she continues to have intermittent painful episodes at PSIS but has much improved since starting PT  Pt would benefit from 1 more session to transition to HEP effectively  Thank you! Impairments: lacks appropriate home exercise program  Understanding of Dx/Px/POC: good   Prognosis: good    Goals  ST  Initiate HEP  met  2  Decrease pain levels by 25-50%   met  3  Increase ROM by 25-50%   met  4  Increase strength by 1-2 mm grades  met  LT  Improve spinal stability with increase trunk/core strength   met  2  Decrease limitations with standing, walking, and stairs  met  3  Decrease limitations with trunk movement, lifting, and carrying  met  4  Decrease limitations with ADL  met  5  DC with HEP progressing    Plan  Plan details: PT notes review of POC and findings with the patient who is in agreement with PT recommendations of skilled therapy  Patient would benefit from: PT eval and skilled physical therapy  Planned modality interventions: thermotherapy: hydrocollator packs  Planned therapy interventions: manual therapy, neuromuscular re-education, patient education, self care, strengthening, stretching, therapeutic exercise, flexibility, graded exercise and home exercise program  Duration in visits: 1  Treatment plan discussed with: patient        Subjective Evaluation    History of Present Illness  Mechanism of injury: Pt notes nearly 100% improvement since starting PT   She feels that she can transition to HEP   Pain  Current pain ratin  At best pain ratin  At worst pain rating: 3  Location: Lumbar spine   Quality: dull ache      Diagnostic Tests  X-ray: normal  Treatments  Current treatment: injection treatment and physical therapy  Patient Goals  Patient goals for therapy: decreased pain, increased motion, increased strength, independence with ADLs/IADLs and return to sport/leisure activities          Objective     Tenderness     Left Hip   No tenderness in the PSIS and sacroiliac joint  Right Hip   No tenderness in the PSIS and sacroiliac joint  Neurological Testing     Reflexes   Left   Patellar (L4): trace (1+)  Achilles (S1): trace (1+)    Right   Patellar (L4): trace (1+)  Achilles (S1): trace (1+)    Active Range of Motion     Lumbar   Flexion:  WFL  Extension:  Restriction level: minimal  Left lateral flexion:  WFL  Right lateral flexion:  WFL  Left rotation:  WFL  Right rotation:  WFL  Left Hip   Flexion: 64 degrees   Abduction: [facility]  External rotation (90/90): 16 degrees   Internal rotation (90/90): 5 degrees     Right Hip   Flexion: 61 degrees   Abduction: [facility]  External rotation (90/90): 12 degrees   Internal rotation (90/90): 0 degrees     Passive Range of Motion   Left Hip   Flexion: 74 degrees     Right Hip   Flexion: 67 degrees     Strength/Myotome Testing     Left Hip   Normal muscle strength    Right Hip   Planes of Motion   Flexion: 4  Extension: 4+  Abduction: 4+  Adduction: 5  External rotation: 4  Internal rotation: 4    Tests     Right Hip   Negative TERRIEMONO and long sit  Abdullahi: Negative  SLR: Negative  Ambulation     Observational Gait   Gait: within functional limits   Walking speed and stride length within functional limits                Precautions:  HTN, DM

## 2022-08-17 NOTE — PROGRESS NOTES
Daily Note     Today's date: 2022  Patient name: Yakelin Carcamo  : 1995  MRN: [id number]  Referring provider: Gali Flores MD  Dx:   Encounter Diagnosis     ICD-10-CM    1  Chronic right sacroiliac joint pain  M53 3     G89 29    2  Lumbar radiculopathy  M54 16                   Subjective: Patient reports that she is pleased with her progress  Objective: See treatment diary below      Assessment: Tolerated treatment well  Patient demonstrated a good understanding of her HEP  Plan: Patient discharged to HEP at this time  Precautions:  HTN, DM     Manuals 8/3 8/4 8/8 8/16 8/18   Bilateral HS, quad, piriformis, hip ABD, and gastroc with manual hip traction  10' 10' 10' 10' 10'    5'Posterior correction of the R innominate 5' Posterior correction of the R innominate 5' Posterior Correction of the R innominate 5' Posterior Correction of the R innominate checked                   Neuro Re-Ed 8/3 8/4 8/8 8/16 8/18   Seated Tball 3 way trunk flexion  10x5"ea 10x5"ea 10x5"ea 10x5"ea    Stand trunk extension against bar        Stand OAL        Supine Tball ABD crunch  15x5" 15x5" 20x5" 20x5"    PPT        PPT with hip march  10x bilat 10x bilat  NT             Ther Ex 8/3 8/4 8/8 8/16 8/18   Treadmill  10' 1  3mph NT 10' 1  3mph 10' 1  3mph 10' 1  3mph   S/L Clam shells 2x10 RED 2x10   no band 2x10 Red 2x10 bilat RED    S/L reverse clam shells  2x10   no band 2x10  No band 2x10   no band [id number] no band    Bridge and with ABD 2x10 Bridge only 2x10 bridge only 2x10 Red 2x10 RED    Side step and squat        Monster walk         LTR 10x5" bilat 10x5" bilat 10x5"bilat 10x5"bilat    HEP update/review      30'   Ther Activity 8/3 8/4 8/8 8/16 8/18                   Gait Training 8/3 8/4 8/8 8/16 8/18                   Modalities 8/3 8/4 8/8 8/16 8/18   MHP  15'in prone declined declined declined declined

## 2022-08-18 ENCOUNTER — OFFICE VISIT (OUTPATIENT)
Dept: PHYSICAL THERAPY | Facility: CLINIC | Age: 27
End: 2022-08-18
Payer: COMMERCIAL

## 2022-08-18 DIAGNOSIS — G89.29 CHRONIC RIGHT SACROILIAC JOINT PAIN: Primary | ICD-10-CM

## 2022-08-18 DIAGNOSIS — M53.3 CHRONIC RIGHT SACROILIAC JOINT PAIN: Primary | ICD-10-CM

## 2022-08-18 DIAGNOSIS — M54.16 LUMBAR RADICULOPATHY: ICD-10-CM

## 2022-08-18 PROCEDURE — 97110 THERAPEUTIC EXERCISES: CPT

## 2022-08-18 PROCEDURE — 97140 MANUAL THERAPY 1/> REGIONS: CPT

## 2022-08-18 PROCEDURE — 97535 SELF CARE MNGMENT TRAINING: CPT

## 2022-08-18 NOTE — PROGRESS NOTES
PT Discharge    Today's date: 2022  Patient name: Juan Galloway  : 1995  MRN: [id number]  Referring provider: Nallely Dee MD  Dx:   Encounter Diagnosis     ICD-10-CM    1  Chronic right sacroiliac joint pain  M53 3     G89 29    2  Lumbar radiculopathy  M54 16                   Assessment  Assessment details: Pt has attended a total of 11 PT sessions and has made adequate progress towards goals to be d/c from PT services  PT reviewed and instructed HEP (handout provided) along with answering pt questions regarding current functional status  Pt has no concerns at time of discharge  Thank you! Understanding of Dx/Px/POC: good   Prognosis: good    Goals  ST  Initiate HEP  met  2  Decrease pain levels by 25-50%   met  3  Increase ROM by 25-50%   met  4  Increase strength by 1-2 mm grades  met  LT  Improve spinal stability with increase trunk/core strength   met  2  Decrease limitations with standing, walking, and stairs  met  3  Decrease limitations with trunk movement, lifting, and carrying  met  4  Decrease limitations with ADL  met  5  DC with HEP met        Subjective Evaluation    History of Present Illness  Mechanism of injury: Pt continues to feel good and is prepared for d/c  Pain  Current pain ratin  At best pain ratin  At worst pain rating: 3  Location: Lumbar spine   Quality: dull ache      Diagnostic Tests  X-ray: normal  Treatments  Current treatment: injection treatment and physical therapy        Objective     Tenderness     Left Hip   No tenderness in the PSIS and sacroiliac joint  Right Hip   No tenderness in the PSIS and sacroiliac joint       Neurological Testing     Reflexes   Left   Patellar (L4): trace (1+)  Achilles (S1): trace (1+)    Right   Patellar (L4): trace (1+)  Achilles (S1): trace (1+)    Active Range of Motion     Lumbar   Flexion:  WFL  Extension:  Restriction level: minimal  Left lateral flexion:  WFL  Right lateral flexion: WFL  Left rotation:  WFL  Right rotation:  [facility]  Left Hip   Flexion: 64 degrees   Abduction: [facility]  External rotation (90/90): 16 degrees   Internal rotation (90/90): 5 degrees     Right Hip   Flexion: 61 degrees   Abduction: WFL  External rotation (90/90): 12 degrees   Internal rotation (90/90): 0 degrees     Passive Range of Motion   Left Hip   Flexion: 74 degrees     Right Hip   Flexion: 67 degrees     Strength/Myotome Testing     Left Hip   Normal muscle strength    Right Hip   Planes of Motion   Flexion: 4  Extension: 4+  Abduction: 4+  Adduction: 5  External rotation: 4  Internal rotation: 4    Tests     Right Hip   Negative TERRIEMONO and long angela  Abdullahi: Negative  SLR: Negative  Ambulation     Observational Gait   Gait: within functional limits   Walking speed and stride length within functional limits                Precautions:  HTN, DM

## 2022-08-22 ENCOUNTER — APPOINTMENT (OUTPATIENT)
Dept: PHYSICAL THERAPY | Facility: CLINIC | Age: 27
End: 2022-08-22
Payer: COMMERCIAL

## 2022-08-25 ENCOUNTER — APPOINTMENT (OUTPATIENT)
Dept: PHYSICAL THERAPY | Facility: CLINIC | Age: 27
End: 2022-08-25
Payer: COMMERCIAL

## 2022-08-29 ENCOUNTER — APPOINTMENT (OUTPATIENT)
Dept: PHYSICAL THERAPY | Facility: CLINIC | Age: 27
End: 2022-08-29
Payer: COMMERCIAL

## 2024-06-17 DIAGNOSIS — Z00.6 ENCOUNTER FOR EXAMINATION FOR NORMAL COMPARISON OR CONTROL IN CLINICAL RESEARCH PROGRAM: ICD-10-CM

## (undated) DEVICE — NEEDLE SPINAL 22G X 3.5IN  QUINCKE

## (undated) DEVICE — SYRINGE 10ML LL

## (undated) DEVICE — GLOVE INDICATOR PI UNDERGLOVE SZ 7.5 BLUE

## (undated) DEVICE — CHLORAPREP HI-LITE 10.5ML ORANGE

## (undated) DEVICE — SYRINGE 3ML LL

## (undated) DEVICE — UTILITY MARKER,BLACK WITH LABELS: Brand: DEVON

## (undated) DEVICE — GAUZE SPONGES,USP TYPE VII GAUZE, 12 PLY: Brand: CURITY

## (undated) DEVICE — PLASTIC ADHESIVE BANDAGE: Brand: CURITY

## (undated) DEVICE — NEEDLE 25G X 1 1/2

## (undated) DEVICE — NEEDLE BLUNT 18 G X 1 1/2IN

## (undated) DEVICE — DRAPE SHEET THREE QUARTER

## (undated) DEVICE — DRAPE TOWEL: Brand: CONVERTORS

## (undated) DEVICE — SYRINGE 5ML LL

## (undated) DEVICE — IV EXTENSION TUBING SMALL BORE